# Patient Record
Sex: FEMALE | Race: BLACK OR AFRICAN AMERICAN | Employment: FULL TIME | URBAN - METROPOLITAN AREA
[De-identification: names, ages, dates, MRNs, and addresses within clinical notes are randomized per-mention and may not be internally consistent; named-entity substitution may affect disease eponyms.]

---

## 2019-02-06 ENCOUNTER — APPOINTMENT (OUTPATIENT)
Dept: CT IMAGING | Age: 54
End: 2019-02-06
Attending: EMERGENCY MEDICINE
Payer: COMMERCIAL

## 2019-02-06 ENCOUNTER — HOSPITAL ENCOUNTER (EMERGENCY)
Age: 54
Discharge: HOME | End: 2019-02-06
Attending: EMERGENCY MEDICINE
Payer: COMMERCIAL

## 2019-02-06 VITALS
OXYGEN SATURATION: 100 % | TEMPERATURE: 97.4 F | RESPIRATION RATE: 16 BRPM | DIASTOLIC BLOOD PRESSURE: 94 MMHG | HEART RATE: 67 BPM | SYSTOLIC BLOOD PRESSURE: 156 MMHG

## 2019-02-06 DIAGNOSIS — N39.0 ACUTE UTI: ICD-10-CM

## 2019-02-06 DIAGNOSIS — N20.1 LEFT URETERAL CALCULUS: Primary | ICD-10-CM

## 2019-02-06 LAB
ALBUMIN SERPL-MCNC: 3.3 G/DL (ref 3.4–5)
ALBUMIN/GLOB SERPL: 0.9 {RATIO} (ref 0.8–1.7)
ALP SERPL-CCNC: 87 U/L (ref 45–117)
ALT SERPL-CCNC: 22 U/L (ref 13–56)
ANION GAP SERPL CALC-SCNC: 7 MMOL/L (ref 3–18)
APPEARANCE UR: ABNORMAL
AST SERPL-CCNC: 11 U/L (ref 15–37)
BACTERIA URNS QL MICRO: ABNORMAL /HPF
BASOPHILS # BLD: 0 K/UL (ref 0–0.1)
BASOPHILS NFR BLD: 0 % (ref 0–3)
BILIRUB SERPL-MCNC: 0.4 MG/DL (ref 0.2–1)
BILIRUB UR QL: NEGATIVE
BUN SERPL-MCNC: 20 MG/DL (ref 7–18)
BUN/CREAT SERPL: 20 (ref 12–20)
CALCIUM SERPL-MCNC: 9.1 MG/DL (ref 8.5–10.1)
CHLORIDE SERPL-SCNC: 108 MMOL/L (ref 100–108)
CK MB CFR SERPL CALC: NORMAL % (ref 0–4)
CK MB SERPL-MCNC: <1 NG/ML (ref 5–25)
CK SERPL-CCNC: 66 U/L (ref 26–192)
CO2 SERPL-SCNC: 29 MMOL/L (ref 21–32)
COLOR UR: YELLOW
CREAT SERPL-MCNC: 1 MG/DL (ref 0.6–1.3)
DIFFERENTIAL METHOD BLD: ABNORMAL
EOSINOPHIL # BLD: 0 K/UL (ref 0–0.4)
EOSINOPHIL NFR BLD: 0 % (ref 0–5)
EPITH CASTS URNS QL MICRO: ABNORMAL /LPF (ref 0–5)
ERYTHROCYTE [DISTWIDTH] IN BLOOD BY AUTOMATED COUNT: 14.5 % (ref 11.6–14.5)
GLOBULIN SER CALC-MCNC: 3.5 G/DL (ref 2–4)
GLUCOSE SERPL-MCNC: 89 MG/DL (ref 74–99)
GLUCOSE UR STRIP.AUTO-MCNC: NEGATIVE MG/DL
HCG SERPL QL: NEGATIVE
HCT VFR BLD AUTO: 41.5 % (ref 35–45)
HGB BLD-MCNC: 12.9 G/DL (ref 12–16)
HGB UR QL STRIP: ABNORMAL
KETONES UR QL STRIP.AUTO: NEGATIVE MG/DL
LEUKOCYTE ESTERASE UR QL STRIP.AUTO: ABNORMAL
LIPASE SERPL-CCNC: 74 U/L (ref 73–393)
LYMPHOCYTES # BLD: 4.2 K/UL (ref 0.8–3.5)
LYMPHOCYTES NFR BLD: 42 % (ref 20–51)
MCH RBC QN AUTO: 28 PG (ref 24–34)
MCHC RBC AUTO-ENTMCNC: 31.1 G/DL (ref 31–37)
MCV RBC AUTO: 90 FL (ref 74–97)
MONOCYTES # BLD: 0.5 K/UL (ref 0–1)
MONOCYTES NFR BLD: 5 % (ref 2–9)
NEUTS SEG # BLD: 5.4 K/UL (ref 1.8–8)
NEUTS SEG NFR BLD: 53 % (ref 42–75)
NITRITE UR QL STRIP.AUTO: NEGATIVE
PH UR STRIP: 6 [PH] (ref 5–8)
PLATELET # BLD AUTO: 235 K/UL (ref 135–420)
PLATELET COMMENTS,PCOM: ABNORMAL
PMV BLD AUTO: 10 FL (ref 9.2–11.8)
POTASSIUM SERPL-SCNC: 3.2 MMOL/L (ref 3.5–5.5)
PROT SERPL-MCNC: 6.8 G/DL (ref 6.4–8.2)
PROT UR STRIP-MCNC: NEGATIVE MG/DL
RBC # BLD AUTO: 4.61 M/UL (ref 4.2–5.3)
RBC #/AREA URNS HPF: ABNORMAL /HPF (ref 0–5)
RBC MORPH BLD: ABNORMAL
SODIUM SERPL-SCNC: 144 MMOL/L (ref 136–145)
SP GR UR REFRACTOMETRY: >1.03 (ref 1–1.03)
TROPONIN I SERPL-MCNC: <0.02 NG/ML (ref 0–0.04)
UROBILINOGEN UR QL STRIP.AUTO: 0.2 EU/DL (ref 0.2–1)
WBC # BLD AUTO: 10.1 K/UL (ref 4.6–13.2)
WBC URNS QL MICRO: ABNORMAL /HPF (ref 0–4)

## 2019-02-06 PROCEDURE — 74011636320 HC RX REV CODE- 636/320: Performed by: EMERGENCY MEDICINE

## 2019-02-06 PROCEDURE — 99284 EMERGENCY DEPT VISIT MOD MDM: CPT

## 2019-02-06 PROCEDURE — 81001 URINALYSIS AUTO W/SCOPE: CPT

## 2019-02-06 PROCEDURE — 80053 COMPREHEN METABOLIC PANEL: CPT

## 2019-02-06 PROCEDURE — 96375 TX/PRO/DX INJ NEW DRUG ADDON: CPT

## 2019-02-06 PROCEDURE — 74011250636 HC RX REV CODE- 250/636: Performed by: EMERGENCY MEDICINE

## 2019-02-06 PROCEDURE — 87086 URINE CULTURE/COLONY COUNT: CPT

## 2019-02-06 PROCEDURE — 74011250637 HC RX REV CODE- 250/637: Performed by: EMERGENCY MEDICINE

## 2019-02-06 PROCEDURE — 96361 HYDRATE IV INFUSION ADD-ON: CPT

## 2019-02-06 PROCEDURE — 93005 ELECTROCARDIOGRAM TRACING: CPT

## 2019-02-06 PROCEDURE — 96376 TX/PRO/DX INJ SAME DRUG ADON: CPT

## 2019-02-06 PROCEDURE — 85025 COMPLETE CBC W/AUTO DIFF WBC: CPT

## 2019-02-06 PROCEDURE — 82550 ASSAY OF CK (CPK): CPT

## 2019-02-06 PROCEDURE — 84703 CHORIONIC GONADOTROPIN ASSAY: CPT

## 2019-02-06 PROCEDURE — 74011000258 HC RX REV CODE- 258: Performed by: EMERGENCY MEDICINE

## 2019-02-06 PROCEDURE — 74177 CT ABD & PELVIS W/CONTRAST: CPT

## 2019-02-06 PROCEDURE — 83690 ASSAY OF LIPASE: CPT

## 2019-02-06 PROCEDURE — 96365 THER/PROPH/DIAG IV INF INIT: CPT

## 2019-02-06 RX ORDER — ONDANSETRON 2 MG/ML
4 INJECTION INTRAMUSCULAR; INTRAVENOUS
Status: COMPLETED | OUTPATIENT
Start: 2019-02-06 | End: 2019-02-06

## 2019-02-06 RX ORDER — ONDANSETRON 8 MG/1
TABLET, ORALLY DISINTEGRATING ORAL
Qty: 10 TAB | Refills: 0 | Status: SHIPPED | OUTPATIENT
Start: 2019-02-06 | End: 2019-02-21

## 2019-02-06 RX ORDER — TAMSULOSIN HYDROCHLORIDE 0.4 MG/1
0.4 CAPSULE ORAL
Status: COMPLETED | OUTPATIENT
Start: 2019-02-06 | End: 2019-02-06

## 2019-02-06 RX ORDER — CIPROFLOXACIN 500 MG/1
500 TABLET ORAL 2 TIMES DAILY
Qty: 14 TAB | Refills: 0 | Status: SHIPPED | OUTPATIENT
Start: 2019-02-06 | End: 2019-02-13

## 2019-02-06 RX ORDER — MORPHINE SULFATE 1 MG/ML
4 INJECTION, SOLUTION EPIDURAL; INTRATHECAL; INTRAVENOUS
Status: COMPLETED | OUTPATIENT
Start: 2019-02-06 | End: 2019-02-06

## 2019-02-06 RX ORDER — KETOROLAC TROMETHAMINE 30 MG/ML
30 INJECTION, SOLUTION INTRAMUSCULAR; INTRAVENOUS
Status: COMPLETED | OUTPATIENT
Start: 2019-02-06 | End: 2019-02-06

## 2019-02-06 RX ORDER — IBUPROFEN 800 MG/1
800 TABLET ORAL EVERY 8 HOURS
Qty: 15 TAB | Refills: 0 | Status: SHIPPED | OUTPATIENT
Start: 2019-02-06 | End: 2019-02-11

## 2019-02-06 RX ORDER — TAMSULOSIN HYDROCHLORIDE 0.4 MG/1
CAPSULE ORAL
Qty: 10 CAP | Refills: 0 | Status: SHIPPED | OUTPATIENT
Start: 2019-02-06 | End: 2019-02-21

## 2019-02-06 RX ORDER — HYDROCODONE BITARTRATE AND ACETAMINOPHEN 5; 325 MG/1; MG/1
TABLET ORAL
Qty: 8 TAB | Refills: 0 | Status: SHIPPED | OUTPATIENT
Start: 2019-02-06

## 2019-02-06 RX ADMIN — CEFTRIAXONE SODIUM 1 G: 1 INJECTION, POWDER, FOR SOLUTION INTRAMUSCULAR; INTRAVENOUS at 17:22

## 2019-02-06 RX ADMIN — ONDANSETRON 4 MG: 2 INJECTION INTRAMUSCULAR; INTRAVENOUS at 17:23

## 2019-02-06 RX ADMIN — Medication 4 MG: at 15:28

## 2019-02-06 RX ADMIN — TAMSULOSIN HYDROCHLORIDE 0.4 MG: 0.4 CAPSULE ORAL at 17:24

## 2019-02-06 RX ADMIN — ONDANSETRON 4 MG: 2 INJECTION INTRAMUSCULAR; INTRAVENOUS at 15:28

## 2019-02-06 RX ADMIN — SODIUM CHLORIDE 1000 ML: 900 INJECTION, SOLUTION INTRAVENOUS at 15:27

## 2019-02-06 RX ADMIN — KETOROLAC TROMETHAMINE 30 MG: 30 INJECTION, SOLUTION INTRAMUSCULAR at 17:23

## 2019-02-06 RX ADMIN — IOPAMIDOL 89 ML: 612 INJECTION, SOLUTION INTRAVENOUS at 15:59

## 2019-02-06 NOTE — ED PROVIDER NOTES
EMERGENCY DEPARTMENT HISTORY AND PHYSICAL EXAM 
 
Date: 2/6/2019 Patient Name: Rosalio Costa History of Presenting Illness Chief Complaint Patient presents with  Abdominal Pain History Provided By: Patient Chief Complaint: abd pain Duration: several Days Timing:  Acute Location: generalized Quality: Cramping and Thyra [place] Severity: Moderate Modifying Factors: intermittent pain; h/o cholecystectomy Associated Symptoms: nausea, flank pain Additional History (Context): Rosalio Costa is a 52 y.o. female with obesity and prior cholecystectomy who presents with generalized abd pain, nausea; denies fever, dysuria, diarrhea. Had small bowel movement this morning. Prior cholecystectomy; unsure if she has an appendix. PCP: Nancy Durant MD 
 
Current Facility-Administered Medications Medication Dose Route Frequency Provider Last Rate Last Dose  cefTRIAXone (ROCEPHIN) 1 g in 0.9% sodium chloride (MBP/ADV) 50 mL MBP  1 g IntraVENous NOW Ayana Mosher PA      
 tamsulosin (FLOMAX) capsule 0.4 mg  0.4 mg Oral NOW Ayana Mosher PA      
 ketorolac (TORADOL) injection 30 mg  30 mg IntraVENous NOW Ayana Mosher PA      
 ondansetron (ZOFRAN) injection 4 mg  4 mg IntraVENous NOW Ayana Mosher PA Current Outpatient Medications Medication Sig Dispense Refill  ciprofloxacin HCl (CIPRO) 500 mg tablet Take 1 Tab by mouth two (2) times a day for 7 days. 14 Tab 0  
 tamsulosin (FLOMAX) 0.4 mg capsule Take 1 tab by mouth daily until kidney stone passes. 10 Cap 0  
 ondansetron (ZOFRAN ODT) 8 mg disintegrating tablet Take 1-2 tablets every 6-8 hours as needed for nausea and vomiting. 10 Tab 0  ibuprofen (MOTRIN) 800 mg tablet Take 1 Tab by mouth every eight (8) hours for 5 days. 15 Tab 0  
 HYDROcodone-acetaminophen (NORCO) 5-325 mg per tablet Take 1 tablet PO every 6 hours as needed for pain control.   If over the counter ibuprofen or acetaminophen was suggested, then only take the vicodin for pain not well controlled with the over the counter medication. 8 Tab 0  
 ZOLPIDEM TARTRATE (AMBIEN PO) Take  by mouth.  cyclobenzaprine (FLEXERIL) 10 mg tablet Take  by mouth three (3) times daily as needed.  traMADol (ULTRAM) 50 mg tablet Take 50 mg by mouth every six (6) hours as needed.  magnesium citrate solution Take 1/3 of bottle every 8 hours until finished or until you have a bowel movement. 1 Bottle 0 Past History Past Medical History: 
Past Medical History:  
Diagnosis Date  Asthma Past Surgical History: 
Past Surgical History:  
Procedure Laterality Date  HX  SECTION    
 HX OTHER SURGICAL Gall stones removed Family History: No family history on file. Social History: 
Social History Tobacco Use  Smoking status: Current Every Day Smoker Substance Use Topics  Alcohol use: No  
 Drug use: No  
 
 
Allergies: 
No Known Allergies Review of Systems Review of Systems Respiratory: Negative for cough and shortness of breath. Cardiovascular: Negative for chest pain. Gastrointestinal: Positive for abdominal pain and nausea. Negative for diarrhea and vomiting. Genitourinary: Positive for flank pain. Negative for dysuria. All other systems reviewed and are negative. All Other Systems Negative Physical Exam  
 
Vitals:  
 19 1444 19 1705 BP: (!) 156/100 (!) 156/94 Pulse: 64 67 Resp: 16 16 Temp: 97.4 °F (36.3 °C) SpO2: 100% 100% Physical Exam  
Constitutional: She is oriented to person, place, and time. She appears well-developed. HENT:  
Head: Normocephalic and atraumatic. Eyes: Pupils are equal, round, and reactive to light. Neck: No JVD present. No tracheal deviation present. No thyromegaly present. Cardiovascular: Normal rate, regular rhythm and normal heart sounds. Exam reveals no gallop and no friction rub. No murmur heard. Pulmonary/Chest: Effort normal and breath sounds normal. No stridor. No respiratory distress. She has no wheezes. She has no rales. She exhibits no tenderness. Abdominal: Soft. She exhibits distension. She exhibits no mass. There is tenderness. There is no rebound and no guarding. Moderate epigastric TTP; milder diffuse lower abd TTP. Distension secondary to body habitus. Musculoskeletal: She exhibits no edema or tenderness. Lymphadenopathy:  
  She has no cervical adenopathy. Neurological: She is alert and oriented to person, place, and time. Skin: Skin is warm and dry. No rash noted. No erythema. No pallor. Psychiatric: She has a normal mood and affect. Her behavior is normal. Thought content normal.  
Nursing note and vitals reviewed. Diagnostic Study Results Labs - Recent Results (from the past 12 hour(s)) CBC WITH AUTOMATED DIFF Collection Time: 02/06/19  3:03 PM  
Result Value Ref Range WBC 10.1 4.6 - 13.2 K/uL  
 RBC 4.61 4.20 - 5.30 M/uL  
 HGB 12.9 12.0 - 16.0 g/dL HCT 41.5 35.0 - 45.0 % MCV 90.0 74.0 - 97.0 FL  
 MCH 28.0 24.0 - 34.0 PG  
 MCHC 31.1 31.0 - 37.0 g/dL  
 RDW 14.5 11.6 - 14.5 % PLATELET 398 063 - 683 K/uL MPV 10.0 9.2 - 11.8 FL  
 NEUTROPHILS 53 42 - 75 % LYMPHOCYTES 42 20 - 51 % MONOCYTES 5 2 - 9 % EOSINOPHILS 0 0 - 5 % BASOPHILS 0 0 - 3 %  
 ABS. NEUTROPHILS 5.4 1.8 - 8.0 K/UL  
 ABS. LYMPHOCYTES 4.2 (H) 0.8 - 3.5 K/UL  
 ABS. MONOCYTES 0.5 0 - 1.0 K/UL  
 ABS. EOSINOPHILS 0.0 0.0 - 0.4 K/UL  
 ABS. BASOPHILS 0.0 0.0 - 0.1 K/UL PLATELET COMMENTS ADEQUATE PLATELETS    
 RBC COMMENTS NORMOCYTIC, NORMOCHROMIC    
 DF MANUAL METABOLIC PANEL, COMPREHENSIVE Collection Time: 02/06/19  3:03 PM  
Result Value Ref Range Sodium 144 136 - 145 mmol/L Potassium 3.2 (L) 3.5 - 5.5 mmol/L Chloride 108 100 - 108 mmol/L  
 CO2 29 21 - 32 mmol/L Anion gap 7 3.0 - 18 mmol/L Glucose 89 74 - 99 mg/dL BUN 20 (H) 7.0 - 18 MG/DL Creatinine 1.00 0.6 - 1.3 MG/DL  
 BUN/Creatinine ratio 20 12 - 20 GFR est AA >60 >60 ml/min/1.73m2 GFR est non-AA 58 (L) >60 ml/min/1.73m2 Calcium 9.1 8.5 - 10.1 MG/DL Bilirubin, total 0.4 0.2 - 1.0 MG/DL  
 ALT (SGPT) 22 13 - 56 U/L  
 AST (SGOT) 11 (L) 15 - 37 U/L Alk. phosphatase 87 45 - 117 U/L Protein, total 6.8 6.4 - 8.2 g/dL Albumin 3.3 (L) 3.4 - 5.0 g/dL Globulin 3.5 2.0 - 4.0 g/dL A-G Ratio 0.9 0.8 - 1.7 LIPASE Collection Time: 02/06/19  3:03 PM  
Result Value Ref Range Lipase 74 73 - 393 U/L  
CARDIAC PANEL,(CK, CKMB & TROPONIN) Collection Time: 02/06/19  3:03 PM  
Result Value Ref Range CK 66 26 - 192 U/L  
 CK - MB <1.0 <3.6 ng/ml CK-MB Index  0.0 - 4.0 % CALCULATION NOT PERFORMED WHEN RESULT IS BELOW LINEAR LIMIT Troponin-I, QT <0.02 0.0 - 0.045 NG/ML  
HCG QL SERUM Collection Time: 02/06/19  3:03 PM  
Result Value Ref Range HCG, Ql. NEGATIVE  NEG    
EKG, 12 LEAD, INITIAL Collection Time: 02/06/19  3:12 PM  
Result Value Ref Range Ventricular Rate 50 BPM  
 Atrial Rate 50 BPM  
 P-R Interval 142 ms QRS Duration 84 ms Q-T Interval 442 ms QTC Calculation (Bezet) 402 ms Calculated P Axis 41 degrees Calculated R Axis 20 degrees Calculated T Axis 41 degrees Diagnosis Sinus bradycardia Otherwise normal ECG When compared with ECG of 01-DEC-2012 03:42, 
Vent. rate has decreased BY  38 BPM 
T wave inversion no longer evident in Inferior leads URINALYSIS W/ RFLX MICROSCOPIC Collection Time: 02/06/19  3:20 PM  
Result Value Ref Range Color YELLOW Appearance CLOUDY Specific gravity >1.030 (H) 1.005 - 1.030  
 pH (UA) 6.0 5.0 - 8.0 Protein NEGATIVE  NEG mg/dL Glucose NEGATIVE  NEG mg/dL Ketone NEGATIVE  NEG mg/dL Bilirubin NEGATIVE  NEG Blood MODERATE (A) NEG Urobilinogen 0.2 0.2 - 1.0 EU/dL  Nitrites NEGATIVE  NEG    
 Leukocyte Esterase MODERATE (A) NEG URINE MICROSCOPIC ONLY Collection Time: 02/06/19  3:20 PM  
Result Value Ref Range WBC 8 to 10 0 - 4 /hpf  
 RBC 8 to 12 0 - 5 /hpf Epithelial cells 1+ 0 - 5 /lpf Bacteria FEW (A) NEG /hpf Radiologic Studies -  
CT ABD PELV W CONT Final Result IMPRESSION:  
  
1. Mild left obstructive uropathy related to a 4 mm calculus in the proximal  
left ureter just beyond the left ureteropelvic junction. 2. No additional acute findings otherwise within the abdomen or pelvis. Normal  
appendix. Sigmoid colon diverticulosis without evidence of acute inflammation. CT Results  (Last 48 hours) 02/06/19 1558  CT ABD PELV W CONT Final result Impression:  IMPRESSION:  
   
1. Mild left obstructive uropathy related to a 4 mm calculus in the proximal  
left ureter just beyond the left ureteropelvic junction. 2. No additional acute findings otherwise within the abdomen or pelvis. Normal  
appendix. Sigmoid colon diverticulosis without evidence of acute inflammation. Narrative:  EXAM: CT ABD PELV W CONT  
   
CLINICAL INDICATION/HISTORY: Worsening left-sided abdominal pain. COMPARISON: Abdominal radiographs 12/1/2012 TECHNIQUE:   CT of the abdomen and pelvis following intravenous contrast  
administration. Coronal and sagittal reformats were generated and reviewed. One or more dose reduction techniques were used on this CT: automated exposure  
control, adjustment of the mAs and/or kVp according to patient size, and  
iterative reconstruction techniques. The specific techniques used on this CT  
exam have been documented in the patient's electronic medical record. Digital  
Imaging and Communications in Medicine (DICOM) format image data are available  
to nonaffiliated external healthcare facilities or entities on a secure, media free, reciprocally searchable basis with patient authorization for at least a  
12-month period after this study. _______________ FINDINGS:  
   
LOWER THORAX: Mild left and right basilar dependent atelectasis. No acute  
pulmonary infiltrate. No pleural effusion. No global cardiomegaly or  
pericardial effusion. LIVER AND BILIARY: No suspicious liver lesions. No biliary dilation. Right  
upper quadrant surgical clips of prior cholecystectomy. SPLEEN:  Normal.  
   
PANCREAS:  Normal.  
   
ADRENALS:  Normal.  
   
KIDNEYS:  Normal-appearing right kidney. Asymmetrically decreased left  
corticomedullary enhancement with mild left perinephric inflammatory stranding  
and mild left hydroureteronephrosis related to a 4 mm calculus in the proximal  
left ureter, just beyond the left ureteropelvic junction (axial image 94). Beyond this calculus the left ureter returns to normal caliber. There are no  
additional left ureteral calculi. Additional nonobstructive 2 mm left renal  
calculus. LYMPH NODES:  No mesenteric or retroperitoneal lymphadenopathy. GI TRACT:  Sigmoid colon diverticulosis without evidence of acute inflammation. Normal caliber small and large bowel loops. No morphology of bowel obstruction. No bowel wall thickening. Normal appendix. PELVIC ORGANS:  Limited distended bladder appears within normal limits. No  
acute abnormality. VASCULATURE: Normal caliber lower thoracic and abdominal aorta . OTHER:   No ascites or free intraperitoneal air. OSSEOUS STRUCTURES:  No acute osseous abnormality. Moderate L3-S1 degenerative  
disk disease and facet arthropathy. _______________ CXR Results  (Last 48 hours) None Medical Decision Making I am the first provider for this patient.  
 
I reviewed the vital signs, available nursing notes, past medical history, past surgical history, family history and social history. Vital Signs-Reviewed the patient's vital signs. Records Reviewed: Nursing Notes Procedures: 
Procedures Provider Notes (Medical Decision Making): uncomfortable appearing; get labs, scan. 4mm LUPJ stone; 8-10 WBC and moderate leuks on UA. Urine cultured. Give rocephin now and d/c home with ABX, meds for pain, nausea. Spoke with Dr. Nyla Rosenberg from urology to review CT findings w/UA findings. Pt's VS stable, no leukocytosis on CBC. Agrees w/plan for discharge and outpatient f/up. Pt given strict return precautions for fever, worsening pain, vomiting through anti-emetic. 
 
5:14 PM 
I have spent 35 minutes of critical care time involved in lab review, consultations with specialist, family decision-making, and documentation. During this entire length of time I was immediately available to the patient. Critical Care: The reason for providing this level of medical care for this critically ill patient was due a critical illness that impaired one or more vital organ systems such that there was a high probability of imminent or life threatening deterioration in the patients condition. This care involved high complexity decision making to assess, manipulate, and support vital system functions, to treat this degreee vital organ system failure and to prevent further life threatening deterioration of the patients condition. MED RECONCILIATION: 
Current Facility-Administered Medications Medication Dose Route Frequency  cefTRIAXone (ROCEPHIN) 1 g in 0.9% sodium chloride (MBP/ADV) 50 mL MBP  1 g IntraVENous NOW  tamsulosin (FLOMAX) capsule 0.4 mg  0.4 mg Oral NOW  ketorolac (TORADOL) injection 30 mg  30 mg IntraVENous NOW  ondansetron (ZOFRAN) injection 4 mg  4 mg IntraVENous NOW Current Outpatient Medications Medication Sig  ciprofloxacin HCl (CIPRO) 500 mg tablet Take 1 Tab by mouth two (2) times a day for 7 days.  tamsulosin (FLOMAX) 0.4 mg capsule Take 1 tab by mouth daily until kidney stone passes.  ondansetron (ZOFRAN ODT) 8 mg disintegrating tablet Take 1-2 tablets every 6-8 hours as needed for nausea and vomiting.  ibuprofen (MOTRIN) 800 mg tablet Take 1 Tab by mouth every eight (8) hours for 5 days.  HYDROcodone-acetaminophen (NORCO) 5-325 mg per tablet Take 1 tablet PO every 6 hours as needed for pain control. If over the counter ibuprofen or acetaminophen was suggested, then only take the vicodin for pain not well controlled with the over the counter medication.  ZOLPIDEM TARTRATE (AMBIEN PO) Take  by mouth.  cyclobenzaprine (FLEXERIL) 10 mg tablet Take  by mouth three (3) times daily as needed.  traMADol (ULTRAM) 50 mg tablet Take 50 mg by mouth every six (6) hours as needed.  magnesium citrate solution Take 1/3 of bottle every 8 hours until finished or until you have a bowel movement. Disposition: 
home DISCHARGE NOTE:  
5:14 PM 
 
Pt has been reexamined. Patient has no new complaints, changes, or physical findings. Care plan outlined and precautions discussed. Results of labs, CT were reviewed with the patient. All medications were reviewed with the patient; will d/c home with see below. All of pt's questions and concerns were addressed. Patient was instructed and agrees to follow up with urology, as well as to return to the ED upon further deterioration. Patient is ready to go home. Follow-up Information Follow up With Specialties Details Why Contact Info Lexie Saul MD Urology Schedule an appointment as soon as possible for a visit in 1 day  [address] [place] [address] 
[phone] [facility] EMERGENCY DEPT Emergency Medicine  If symptoms worsen return immediately [address] 
[phone] Current Discharge Medication List  
  
START taking these medications Details ciprofloxacin HCl (CIPRO) 500 mg tablet Take 1 Tab by mouth two (2) times a day for 7 days. Qty: 14 Tab, Refills: 0  
  
tamsulosin (FLOMAX) 0.4 mg capsule Take 1 tab by mouth daily until kidney stone passes. Qty: 10 Cap, Refills: 0  
  
ondansetron (ZOFRAN ODT) 8 mg disintegrating tablet Take 1-2 tablets every 6-8 hours as needed for nausea and vomiting. Qty: 10 Tab, Refills: 0  
  
ibuprofen (MOTRIN) 800 mg tablet Take 1 Tab by mouth every eight (8) hours for 5 days. Qty: 15 Tab, Refills: 0 CONTINUE these medications which have CHANGED Details HYDROcodone-acetaminophen (NORCO) 5-325 mg per tablet Take 1 tablet PO every 6 hours as needed for pain control. If over the counter ibuprofen or acetaminophen was suggested, then only take the vicodin for pain not well controlled with the over the counter medication. Qty: 8 Tab, Refills: 0 Associated Diagnoses: Left ureteral calculus STOP taking these medications  
  
 azithromycin (ZITHROMAX Z-BRANDI) 250 mg tablet Comments:  
Reason for Stopping:   
   
  
 
 
 
 
 
Diagnosis Clinical Impression: 1. Left ureteral calculus 2. Acute UTI

## 2019-02-06 NOTE — DISCHARGE INSTRUCTIONS
Patient Education        Kidney Stone: Care Instructions  Your Care Instructions    Kidney stones are formed when salts, minerals, and other substances normally found in the urine clump together. They can be as small as grains of sand or, rarely, as large as golf balls. While the stone is traveling through the ureter, which is the tube that carries urine from the kidney to the bladder, you will probably feel pain. The pain may be mild or very severe. You may also have some blood in your urine. As soon as the stone reaches the bladder, any intense pain should go away. If a stone is too large to pass on its own, you may need a medical procedure to help you pass the stone. The doctor has checked you carefully, but problems can develop later. If you notice any problems or new symptoms, get medical treatment right away. Follow-up care is a key part of your treatment and safety. Be sure to make and go to all appointments, and call your doctor if you are having problems. It's also a good idea to know your test results and keep a list of the medicines you take. How can you care for yourself at home? · Drink plenty of fluids, enough so that your urine is light yellow or clear like water. If you have kidney, heart, or liver disease and have to limit fluids, talk with your doctor before you increase the amount of fluids you drink. · Take pain medicines exactly as directed. Call your doctor if you think you are having a problem with your medicine. ? If the doctor gave you a prescription medicine for pain, take it as prescribed. ? If you are not taking a prescription pain medicine, ask your doctor if you can take an over-the-counter medicine. Read and follow all instructions on the label. · Your doctor may ask you to strain your urine so that you can collect your kidney stone when it passes. You can use a kitchen strainer or a tea strainer to catch the stone.  Store it in a plastic bag until you see your doctor again.  Preventing future kidney stones  Some changes in your diet may help prevent kidney stones. Depending on the cause of your stones, your doctor may recommend that you:  · Drink plenty of fluids, enough so that your urine is light yellow or clear like water. If you have kidney, heart, or liver disease and have to limit fluids, talk with your doctor before you increase the amount of fluids you drink. · Limit coffee, tea, and alcohol. Also avoid grapefruit juice. · Do not take more than the recommended daily dose of vitamins C and D.  · Avoid antacids such as Gaviscon, Maalox, Mylanta, or Tums. · Limit the amount of salt (sodium) in your diet. · Eat a balanced diet that is not too high in protein. · Limit foods that are high in a substance called oxalate, which can cause kidney stones. These foods include dark green vegetables, rhubarb, chocolate, wheat bran, nuts, cranberries, and beans. When should you call for help? Call your doctor now or seek immediate medical care if:    · You cannot keep down fluids.     · Your pain gets worse.     · You have a fever or chills.     · You have new or worse pain in your back just below your rib cage (the flank area).     · You have new or more blood in your urine.    Watch closely for changes in your health, and be sure to contact your doctor if:    · You do not get better as expected. Where can you learn more? Go to http://corina-mason.info/. Enter V838 in the search box to learn more about \"Kidney Stone: Care Instructions. \"  Current as of: March 14, 2018  Content Version: 11.9  © 4669-8193 [employer]. Care instructions adapted under license by [employer] (which disclaims liability or warranty for this information).  If you have questions about a medical condition or this instruction, always ask your healthcare professional. [address] any warranty or liability for your use of this information. Patient Education        Urinary Tract Infection in Women: Care Instructions  Your Care Instructions    A urinary tract infection, or UTI, is a general term for an infection anywhere between the kidneys and the urethra (where urine comes out). Most UTIs are bladder infections. They often cause pain or burning when you urinate. UTIs are caused by bacteria and can be cured with antibiotics. Be sure to complete your treatment so that the infection goes away. Follow-up care is a key part of your treatment and safety. Be sure to make and go to all appointments, and call your doctor if you are having problems. It's also a good idea to know your test results and keep a list of the medicines you take. How can you care for yourself at home? · Take your antibiotics as directed. Do not stop taking them just because you feel better. You need to take the full course of antibiotics. · Drink extra water and other fluids for the next day or two. This may help wash out the bacteria that are causing the infection. (If you have kidney, heart, or liver disease and have to limit fluids, talk with your doctor before you increase your fluid intake.)  · Avoid drinks that are carbonated or have caffeine. They can irritate the bladder. · Urinate often. Try to empty your bladder each time. · To relieve pain, take a hot bath or lay a heating pad set on low over your lower belly or genital area. Never go to sleep with a heating pad in place. To prevent UTIs  · Drink plenty of water each day. This helps you urinate often, which clears bacteria from your system. (If you have kidney, heart, or liver disease and have to limit fluids, talk with your doctor before you increase your fluid intake.)  · Urinate when you need to. · Urinate right after you have sex. · Change sanitary pads often. · Avoid douches, bubble baths, feminine hygiene sprays, and other feminine hygiene products that have deodorants.   · After going to the bathroom, wipe from front to back. When should you call for help? Call your doctor now or seek immediate medical care if:    · Symptoms such as fever, chills, nausea, or vomiting get worse or appear for the first time.     · You have new pain in your back just below your rib cage. This is called flank pain.     · There is new blood or pus in your urine.     · You have any problems with your antibiotic medicine.    Watch closely for changes in your health, and be sure to contact your doctor if:    · You are not getting better after taking an antibiotic for 2 days.     · Your symptoms go away but then come back. Where can you learn more? Go to http://corina-mason.info/. Enter J714 in the search box to learn more about \"Urinary Tract Infection in Women: Care Instructions. \"  Current as of: March 20, 2018  Content Version: 11.9  © 4611-2505 [employer]. Care instructions adapted under license by [employer] (which disclaims liability or warranty for this information). If you have questions about a medical condition or this instruction, always ask your healthcare professional. [address] any warranty or liability for your use of this information.

## 2019-02-07 LAB
ATRIAL RATE: 50 BPM
CALCULATED P AXIS, ECG09: 41 DEGREES
CALCULATED R AXIS, ECG10: 20 DEGREES
CALCULATED T AXIS, ECG11: 41 DEGREES
DIAGNOSIS, 93000: NORMAL
P-R INTERVAL, ECG05: 142 MS
Q-T INTERVAL, ECG07: 442 MS
QRS DURATION, ECG06: 84 MS
QTC CALCULATION (BEZET), ECG08: 402 MS
VENTRICULAR RATE, ECG03: 50 BPM

## 2019-02-08 LAB
BACTERIA SPEC CULT: NORMAL
SERVICE CMNT-IMP: NORMAL

## 2019-02-10 ENCOUNTER — APPOINTMENT (OUTPATIENT)
Dept: GENERAL RADIOLOGY | Age: 54
End: 2019-02-10
Attending: EMERGENCY MEDICINE
Payer: COMMERCIAL

## 2019-02-10 ENCOUNTER — HOSPITAL ENCOUNTER (EMERGENCY)
Age: 54
Discharge: HOME | End: 2019-02-10
Attending: EMERGENCY MEDICINE
Payer: COMMERCIAL

## 2019-02-10 VITALS
WEIGHT: 284 LBS | HEIGHT: 68 IN | TEMPERATURE: 97.5 F | HEART RATE: 64 BPM | RESPIRATION RATE: 16 BRPM | SYSTOLIC BLOOD PRESSURE: 116 MMHG | OXYGEN SATURATION: 100 % | DIASTOLIC BLOOD PRESSURE: 72 MMHG | BODY MASS INDEX: 43.04 KG/M2

## 2019-02-10 DIAGNOSIS — N20.0 NEPHROLITHIASIS: Primary | ICD-10-CM

## 2019-02-10 DIAGNOSIS — R11.0 NAUSEA WITHOUT VOMITING: ICD-10-CM

## 2019-02-10 LAB
ANION GAP SERPL CALC-SCNC: 7 MMOL/L (ref 3–18)
APPEARANCE UR: CLEAR
BACTERIA URNS QL MICRO: ABNORMAL /HPF
BASOPHILS # BLD: 0 K/UL (ref 0–0.1)
BASOPHILS NFR BLD: 0 % (ref 0–2)
BILIRUB UR QL: NEGATIVE
BUN SERPL-MCNC: 12 MG/DL (ref 7–18)
BUN/CREAT SERPL: 13 (ref 12–20)
CALCIUM SERPL-MCNC: 9.4 MG/DL (ref 8.5–10.1)
CHLORIDE SERPL-SCNC: 108 MMOL/L (ref 100–108)
CO2 SERPL-SCNC: 27 MMOL/L (ref 21–32)
COLOR UR: YELLOW
CREAT SERPL-MCNC: 0.95 MG/DL (ref 0.6–1.3)
DIFFERENTIAL METHOD BLD: NORMAL
EOSINOPHIL # BLD: 0.2 K/UL (ref 0–0.4)
EOSINOPHIL NFR BLD: 2 % (ref 0–5)
EPITH CASTS URNS QL MICRO: ABNORMAL /LPF (ref 0–5)
ERYTHROCYTE [DISTWIDTH] IN BLOOD BY AUTOMATED COUNT: 13.9 % (ref 11.6–14.5)
GLUCOSE SERPL-MCNC: 108 MG/DL (ref 74–99)
GLUCOSE UR STRIP.AUTO-MCNC: NEGATIVE MG/DL
HCT VFR BLD AUTO: 42.8 % (ref 35–45)
HGB BLD-MCNC: 13.6 G/DL (ref 12–16)
HGB UR QL STRIP: ABNORMAL
KETONES UR QL STRIP.AUTO: NEGATIVE MG/DL
LEUKOCYTE ESTERASE UR QL STRIP.AUTO: ABNORMAL
LYMPHOCYTES # BLD: 2.4 K/UL (ref 0.9–3.6)
LYMPHOCYTES NFR BLD: 36 % (ref 21–52)
MCH RBC QN AUTO: 28.5 PG (ref 24–34)
MCHC RBC AUTO-ENTMCNC: 31.8 G/DL (ref 31–37)
MCV RBC AUTO: 89.5 FL (ref 74–97)
MONOCYTES # BLD: 0.2 K/UL (ref 0.05–1.2)
MONOCYTES NFR BLD: 4 % (ref 3–10)
MUCOUS THREADS URNS QL MICRO: ABNORMAL /LPF
NEUTS SEG # BLD: 3.9 K/UL (ref 1.8–8)
NEUTS SEG NFR BLD: 58 % (ref 40–73)
NITRITE UR QL STRIP.AUTO: NEGATIVE
PH UR STRIP: 7 [PH] (ref 5–8)
PLATELET # BLD AUTO: 220 K/UL (ref 135–420)
PMV BLD AUTO: 10.2 FL (ref 9.2–11.8)
POTASSIUM SERPL-SCNC: 3.7 MMOL/L (ref 3.5–5.5)
PROT UR STRIP-MCNC: NEGATIVE MG/DL
RBC # BLD AUTO: 4.78 M/UL (ref 4.2–5.3)
RBC #/AREA URNS HPF: ABNORMAL /HPF (ref 0–5)
SODIUM SERPL-SCNC: 142 MMOL/L (ref 136–145)
SP GR UR REFRACTOMETRY: 1.01 (ref 1–1.03)
UROBILINOGEN UR QL STRIP.AUTO: 0.2 EU/DL (ref 0.2–1)
WBC # BLD AUTO: 6.7 K/UL (ref 4.6–13.2)
WBC URNS QL MICRO: ABNORMAL /HPF (ref 0–4)

## 2019-02-10 PROCEDURE — 74011250637 HC RX REV CODE- 250/637: Performed by: EMERGENCY MEDICINE

## 2019-02-10 PROCEDURE — 96361 HYDRATE IV INFUSION ADD-ON: CPT

## 2019-02-10 PROCEDURE — 99284 EMERGENCY DEPT VISIT MOD MDM: CPT

## 2019-02-10 PROCEDURE — 81001 URINALYSIS AUTO W/SCOPE: CPT

## 2019-02-10 PROCEDURE — 87086 URINE CULTURE/COLONY COUNT: CPT

## 2019-02-10 PROCEDURE — 74022 RADEX COMPL AQT ABD SERIES: CPT

## 2019-02-10 PROCEDURE — 85025 COMPLETE CBC W/AUTO DIFF WBC: CPT

## 2019-02-10 PROCEDURE — 96374 THER/PROPH/DIAG INJ IV PUSH: CPT

## 2019-02-10 PROCEDURE — 74011250636 HC RX REV CODE- 250/636: Performed by: EMERGENCY MEDICINE

## 2019-02-10 PROCEDURE — 80048 BASIC METABOLIC PNL TOTAL CA: CPT

## 2019-02-10 RX ORDER — HYDROCODONE BITARTRATE AND ACETAMINOPHEN 5; 325 MG/1; MG/1
2 TABLET ORAL
Status: COMPLETED | OUTPATIENT
Start: 2019-02-10 | End: 2019-02-10

## 2019-02-10 RX ORDER — HYDROCODONE BITARTRATE AND ACETAMINOPHEN 5; 325 MG/1; MG/1
1 TABLET ORAL
Qty: 20 TAB | Refills: 0 | Status: SHIPPED | OUTPATIENT
Start: 2019-02-10 | End: 2019-02-21

## 2019-02-10 RX ORDER — ONDANSETRON 4 MG/1
TABLET, ORALLY DISINTEGRATING ORAL
Qty: 10 TAB | Refills: 0 | Status: SHIPPED | OUTPATIENT
Start: 2019-02-10 | End: 2019-02-21

## 2019-02-10 RX ORDER — KETOROLAC TROMETHAMINE 30 MG/ML
30 INJECTION, SOLUTION INTRAMUSCULAR; INTRAVENOUS
Status: COMPLETED | OUTPATIENT
Start: 2019-02-10 | End: 2019-02-10

## 2019-02-10 RX ORDER — SODIUM CHLORIDE 9 MG/ML
150 INJECTION, SOLUTION INTRAVENOUS ONCE
Status: COMPLETED | OUTPATIENT
Start: 2019-02-10 | End: 2019-02-10

## 2019-02-10 RX ORDER — DOCUSATE SODIUM 100 MG/1
100 CAPSULE, LIQUID FILLED ORAL 2 TIMES DAILY
Qty: 60 CAP | Refills: 2 | Status: SHIPPED | OUTPATIENT
Start: 2019-02-10 | End: 2019-02-21

## 2019-02-10 RX ORDER — ONDANSETRON 8 MG/1
8 TABLET, ORALLY DISINTEGRATING ORAL
Status: DISCONTINUED | OUTPATIENT
Start: 2019-02-10 | End: 2019-02-10 | Stop reason: HOSPADM

## 2019-02-10 RX ADMIN — HYDROCODONE BITARTRATE AND ACETAMINOPHEN 2 TABLET: 5; 325 TABLET ORAL at 10:17

## 2019-02-10 RX ADMIN — SODIUM CHLORIDE 150 ML/HR: 900 INJECTION, SOLUTION INTRAVENOUS at 09:38

## 2019-02-10 RX ADMIN — KETOROLAC TROMETHAMINE 30 MG: 30 INJECTION, SOLUTION INTRAMUSCULAR at 09:38

## 2019-02-10 NOTE — LETTER
[facility] EMERGENCY DEPT 
[address] [facility] [id number] [phone] Work/School Note Date: 2/10/2019 To Whom It May concern: 
 
Trinidad Paul was seen and treated today in the emergency room by the following provider(s): 
Attending Provider: Danilo Smart MD. Trinidad Paul may return to work on 2/12/2019.  
 
Sincerely, 
 
 
 
 
Jeb Degroot RN

## 2019-02-10 NOTE — ED TRIAGE NOTES
Patient was seen on Wednesday, dx with kidney stones, was told to go home and return if pain was worse, pain is worse and patient states she has not passed the stone/s

## 2019-02-10 NOTE — DISCHARGE INSTRUCTIONS
Please return for increased pain, fevers, chills, chest pain, shortness of breath or any other concerns    Please see your doctor or urologist in 24-48 hours for a recheck.

## 2019-02-10 NOTE — ED PROVIDER NOTES
EMERGENCY DEPARTMENT HISTORY AND PHYSICAL EXAM 
 
9:07 AM 
 
 
Date: 2/10/2019 Patient Name: Sarita German History of Presenting Illness Chief Complaint Patient presents with  Flank Pain  Nausea History Provided By: Patient Additional History (Context): Sarita German is a 52 y.o. female with a history of kidney stones who presents with moderate worsening left flank pain radiating to her abdomen with associated nausea, subjective fever, and chills that began 2 weeks ago. Also reports left sided neck pain that began this morning. Denies vomiting, difficulty urinating, chest pain, and SOB. She patient states she was seen on 2/6/19 at [facility] ED and diagnosed with a left kidney stone. She was prescribed pain medication. Her last dose was this morning. The patient was discharged and her pain has worsened. She has not yet passed the stone. PCP: Citlali Cotto MD 
 
Chief Complaint: Flank Pain Duration:  2 weeks Timing:  Worsening Location: Left flank radiating to her abdomen Quality: Cramping Severity: Moderate Modifying Factors: The patient was seen on 2/6/19 at [facility] ED and diagnosed with a left kidney stone Associated Symptoms: Abdominal pain, nausea, subjective fever, and chills. Denies vomiting, difficulty urinating, chest pain, and SOB Current Facility-Administered Medications Medication Dose Route Frequency Provider Last Rate Last Dose  ondansetron (ZOFRAN ODT) tablet 8 mg  8 mg Oral Q8H PRN Cecilia Spann MD      
 
Current Outpatient Medications Medication Sig Dispense Refill  ciprofloxacin HCl (CIPRO) 500 mg tablet Take 1 Tab by mouth two (2) times a day for 7 days. 14 Tab 0  
 tamsulosin (FLOMAX) 0.4 mg capsule Take 1 tab by mouth daily until kidney stone passes. 10 Cap 0  
 ondansetron (ZOFRAN ODT) 8 mg disintegrating tablet Take 1-2 tablets every 6-8 hours as needed for nausea and vomiting.  10 Tab 0  
  ibuprofen (MOTRIN) 800 mg tablet Take 1 Tab by mouth every eight (8) hours for 5 days. 15 Tab 0  
 HYDROcodone-acetaminophen (NORCO) 5-325 mg per tablet Take 1 tablet PO every 6 hours as needed for pain control. If over the counter ibuprofen or acetaminophen was suggested, then only take the vicodin for pain not well controlled with the over the counter medication. 8 Tab 0  
 ZOLPIDEM TARTRATE (AMBIEN PO) Take  by mouth.  cyclobenzaprine (FLEXERIL) 10 mg tablet Take  by mouth three (3) times daily as needed.  traMADol (ULTRAM) 50 mg tablet Take 50 mg by mouth every six (6) hours as needed.  magnesium citrate solution Take 1/3 of bottle every 8 hours until finished or until you have a bowel movement. 1 Bottle 0 Past History Past Medical History: 
Past Medical History:  
Diagnosis Date  Asthma Past Surgical History: 
Past Surgical History:  
Procedure Laterality Date  HX  SECTION    
 HX OTHER SURGICAL Gall stones removed Family History: No family history on file. Social History: 
Social History Tobacco Use  Smoking status: Current Every Day Smoker Substance Use Topics  Alcohol use: No  
 Drug use: No  
 
 
Allergies: 
No Known Allergies Review of Systems Review of Systems Constitutional: Positive for chills and fever. Negative for fatigue. HENT: Negative for congestion. Eyes: Negative for visual disturbance. Respiratory: Negative for shortness of breath. Cardiovascular: Negative for chest pain and leg swelling. Gastrointestinal: Positive for nausea. Negative for abdominal pain and vomiting. Endocrine: Negative for polyuria. Genitourinary: Positive for flank pain. Negative for difficulty urinating and dysuria. Musculoskeletal: Positive for neck pain. Skin: Negative for rash. Neurological: Negative for weakness. Psychiatric/Behavioral: Negative for behavioral problems. All other systems reviewed and are negative. Physical Exam  
 
Visit Vitals BP (!) 162/99 (BP 1 Location: Right arm, BP Patient Position: At rest;Sitting) Pulse 64 Temp 97.5 °F (36.4 °C) Resp 16 Ht 5' 8\" (1.727 m) Wt 128.8 kg (284 lb) SpO2 100% BMI 43.18 kg/m² Physical Exam  
Constitutional: She is oriented to person, place, and time. She appears well-developed and well-nourished. Appears uncomfortable. HENT:  
Head: Normocephalic and atraumatic. Nose: Nose normal.  
Eyes: Conjunctivae are normal.  
Neck: Neck supple. No tracheal deviation present. Cardiovascular: Normal rate, regular rhythm and normal heart sounds. Pulmonary/Chest: Effort normal and breath sounds normal. No respiratory distress. Abdominal: Soft. She exhibits no distension. There is no tenderness. Musculoskeletal: Normal range of motion. She exhibits no edema. Lymphadenopathy:  
  She has no cervical adenopathy. Neurological: She is alert and oriented to person, place, and time. No cranial nerve deficit. Grossly intact Skin: Skin is warm and dry. Psychiatric: She has a normal mood and affect. Vitals reviewed. Diagnostic Study Results Labs - Recent Results (from the past 12 hour(s)) URINALYSIS W/ RFLX MICROSCOPIC Collection Time: 02/10/19  8:51 AM  
Result Value Ref Range Color YELLOW Appearance CLEAR Specific gravity 1.011 1.005 - 1.030    
 pH (UA) 7.0 5.0 - 8.0 Protein NEGATIVE  NEG mg/dL Glucose NEGATIVE  NEG mg/dL Ketone NEGATIVE  NEG mg/dL Bilirubin NEGATIVE  NEG Blood TRACE (A) NEG Urobilinogen 0.2 0.2 - 1.0 EU/dL Nitrites NEGATIVE  NEG Leukocyte Esterase MODERATE (A) NEG URINE MICROSCOPIC ONLY Collection Time: 02/10/19  8:51 AM  
Result Value Ref Range WBC 0 to 3 0 - 4 /hpf  
 RBC 0 to 3 0 - 5 /hpf Epithelial cells 4+ 0 - 5 /lpf Bacteria 2+ (A) NEG /hpf  Mucus 2+ (A) NEG /lpf  
CBC WITH AUTOMATED DIFF  
 Collection Time: 02/10/19  9:04 AM  
Result Value Ref Range WBC 6.7 4.6 - 13.2 K/uL  
 RBC 4.78 4.20 - 5.30 M/uL  
 HGB 13.6 12.0 - 16.0 g/dL HCT 42.8 35.0 - 45.0 % MCV 89.5 74.0 - 97.0 FL  
 MCH 28.5 24.0 - 34.0 PG  
 MCHC 31.8 31.0 - 37.0 g/dL  
 RDW 13.9 11.6 - 14.5 % PLATELET 345 059 - 954 K/uL MPV 10.2 9.2 - 11.8 FL  
 NEUTROPHILS 58 40 - 73 % LYMPHOCYTES 36 21 - 52 % MONOCYTES 4 3 - 10 % EOSINOPHILS 2 0 - 5 % BASOPHILS 0 0 - 2 %  
 ABS. NEUTROPHILS 3.9 1.8 - 8.0 K/UL  
 ABS. LYMPHOCYTES 2.4 0.9 - 3.6 K/UL  
 ABS. MONOCYTES 0.2 0.05 - 1.2 K/UL  
 ABS. EOSINOPHILS 0.2 0.0 - 0.4 K/UL  
 ABS. BASOPHILS 0.0 0.0 - 0.1 K/UL  
 DF AUTOMATED METABOLIC PANEL, BASIC Collection Time: 02/10/19  9:04 AM  
Result Value Ref Range Sodium 142 136 - 145 mmol/L Potassium 3.7 3.5 - 5.5 mmol/L Chloride 108 100 - 108 mmol/L  
 CO2 27 21 - 32 mmol/L Anion gap 7 3.0 - 18 mmol/L Glucose 108 (H) 74 - 99 mg/dL BUN 12 7.0 - 18 MG/DL Creatinine 0.95 0.6 - 1.3 MG/DL  
 BUN/Creatinine ratio 13 12 - 20 GFR est AA >60 >60 ml/min/1.73m2 GFR est non-AA >60 >60 ml/min/1.73m2 Calcium 9.4 8.5 - 10.1 MG/DL Radiologic Studies -  
XR ABD ACUTE W 1 V CHEST    (Results Pending) Medical Decision Making It should be noted that I, Jesika Tian MD will be the provider of record for this patient. I reviewed the vital signs, available nursing notes, past medical history, past surgical history, family history and social history. Vital Signs-Reviewed the patient's vital signs. Pulse Oximetry Analysis -  100% on room air (Interpretation) WNL Records Reviewed: Nursing Notes, Old Medical Records and Previous Radiology Studies (Time of Review: 9:07 AM) ED Course: Progress Notes, Reevaluation, and Consults: 
 
9:16 AM Urine culture likely contaminated. 10:15 AM Jenni Arias (Urology) will provide with follow up in the office. 10:21 AM Patient's pain is well controlled. Agrees to follow up and will go home. ED Course as of Feb 10 1023 Urszula Too Feb 10, 2019  
0906 IMPRESSION IMPRESSION: 
  
1. Mild left obstructive uropathy related to a 4 mm calculus in the proximal 
left ureter just beyond the left ureteropelvic junction. 2. No additional acute findings otherwise within the abdomen or pelvis. Normal 
appendix. Sigmoid colon diverticulosis without evidence of acute inflammation. 
   [BT] 5633 Pt with urine contaminated  [BT] ED Course User Index [BT] Meri Cabrera MD  
 
Provider Notes (Medical Decision Making):  
 
Diagnosis Clinical Impression: 1. Nephrolithiasis 2. Nausea without vomiting Disposition: Home Follow-up Information Follow up With Specialties Details Why Contact Info David Arreola MD Urology Schedule an appointment as soon as possible for a visit in 1 day  [address] [place] [address] 
[phone] Medication List  
  
CONTINUE taking these medications HYDROcodone-acetaminophen 5-325 mg per tablet Commonly known as:  14[address] Long[address] Take 1 tablet PO every 6 hours as needed for pain control. If over the counter ibuprofen or acetaminophen was suggested, then only take the vicodin for pain not well controlled with the over the counter medication. ASK your doctor about these medications AMBIEN PO 
  
ciprofloxacin HCl 500 mg tablet Commonly known as:  CIPRO Take 1 Tab by mouth two (2) times a day for 7 days. FLEXERIL 10 mg tablet Generic drug:  cyclobenzaprine 
  
ibuprofen 800 mg tablet Commonly known as:  MOTRIN Take 1 Tab by mouth every eight (8) hours for 5 days. magnesium citrate solution Take 1/3 of bottle every 8 hours until finished or until you have a bowel movement. ondansetron 8 mg disintegrating tablet Commonly known as:  ZOFRAN ODT Take 1-2 tablets every 6-8 hours as needed for nausea and vomiting. tamsulosin 0.4 mg capsule Commonly known as:  FLOMAX Take 1 tab by mouth daily until kidney stone passes. traMADol 50 mg tablet Commonly known as:  ULTRAM 
  
  
 
_______________________________ Scribe Attestation Anu Obrien acting as a scribe for and in the presence of Zenaida Medina MD     
February 10, 2019 at 10:23 AM 
    
Provider Attestation:     
I personally performed the services described in the documentation, reviewed the documentation, as recorded by the scribe in my presence, and it accurately and completely records my words and actions. February 10, 2019 at 10:23 AM - Zenaida Medina MD   
 
 
_______________________________

## 2019-02-11 LAB
BACTERIA SPEC CULT: NORMAL
SERVICE CMNT-IMP: NORMAL

## 2021-04-14 ENCOUNTER — OFFICE VISIT (OUTPATIENT)
Dept: ORTHOPEDIC SURGERY | Age: 56
End: 2021-04-14
Payer: COMMERCIAL

## 2021-04-14 VITALS
HEIGHT: 68 IN | OXYGEN SATURATION: 99 % | TEMPERATURE: 97.3 F | HEART RATE: 79 BPM | BODY MASS INDEX: 41.71 KG/M2 | WEIGHT: 275.2 LBS

## 2021-04-14 DIAGNOSIS — G89.29 CHRONIC PAIN OF BOTH KNEES: ICD-10-CM

## 2021-04-14 DIAGNOSIS — M17.11 PRIMARY OSTEOARTHRITIS OF RIGHT KNEE: Primary | ICD-10-CM

## 2021-04-14 DIAGNOSIS — M25.561 CHRONIC PAIN OF BOTH KNEES: ICD-10-CM

## 2021-04-14 DIAGNOSIS — M17.12 PRIMARY OSTEOARTHRITIS OF LEFT KNEE: ICD-10-CM

## 2021-04-14 DIAGNOSIS — M25.562 CHRONIC PAIN OF BOTH KNEES: ICD-10-CM

## 2021-04-14 PROCEDURE — 99203 OFFICE O/P NEW LOW 30 MIN: CPT | Performed by: ORTHOPAEDIC SURGERY

## 2021-04-14 PROCEDURE — 20611 DRAIN/INJ JOINT/BURSA W/US: CPT | Performed by: ORTHOPAEDIC SURGERY

## 2021-04-14 PROCEDURE — 73560 X-RAY EXAM OF KNEE 1 OR 2: CPT | Performed by: ORTHOPAEDIC SURGERY

## 2021-04-14 RX ORDER — IBUPROFEN 800 MG/1
TABLET ORAL
COMMUNITY

## 2021-04-14 RX ORDER — TRIAMCINOLONE ACETONIDE 40 MG/ML
80 INJECTION, SUSPENSION INTRA-ARTICULAR; INTRAMUSCULAR ONCE
Status: COMPLETED | OUTPATIENT
Start: 2021-04-14 | End: 2021-04-14

## 2021-04-14 RX ORDER — PREDNISONE 10 MG/1
TABLET ORAL AS NEEDED
COMMUNITY

## 2021-04-14 RX ORDER — MELOXICAM 15 MG/1
15 TABLET ORAL
Qty: 30 TAB | Refills: 1 | Status: SHIPPED | OUTPATIENT
Start: 2021-04-14

## 2021-04-14 RX ORDER — ALBUTEROL SULFATE 90 UG/1
AEROSOL, METERED RESPIRATORY (INHALATION) AS NEEDED
COMMUNITY

## 2021-04-14 RX ADMIN — TRIAMCINOLONE ACETONIDE 80 MG: 40 INJECTION, SUSPENSION INTRA-ARTICULAR; INTRAMUSCULAR at 16:35

## 2021-04-14 NOTE — PROGRESS NOTES
Maria M Hurst  1965   Chief Complaint   Patient presents with    Knee Pain     bilateral        HISTORY OF PRESENT ILLNESS  Maria M Hurst is a 60 y.o. female who presents today for evaluation of b/l knee pain. R>L. She rates her pain 7/10 today. Pain has been present for around 1 year. No specific injury. Having difficulty with steps. Notes popping sensation. Feels tight, like a rubber band. Pain has gradually gotten worse. Pt notes swelling, worse with the right knee. Describes aching, throbbing pain while laying down at night. Patient describes the pain as aching and popping and locking that is Intermittent in nature. Symptoms are worse with walking, stairs, standing, straightening, stretching, Activity and is better with  rx pain medication . Associated symptoms include Swelling. Since problem started, it: is unchanged. Pain does not wake patient up at night. Has taken no meds for the problem. Has tried following treatments: Injections:NO; Brace:NO;  Therapy:NO; Cane/Crutch:NO      No Known Allergies     Past Medical History:   Diagnosis Date    Asthma       Social History     Socioeconomic History    Marital status: LEGALLY      Spouse name: Not on file    Number of children: Not on file    Years of education: Not on file    Highest education level: Not on file   Occupational History    Not on file   Social Needs    Financial resource strain: Not on file    Food insecurity     Worry: Not on file     Inability: Not on file    Transportation needs     Medical: Not on file     Non-medical: Not on file   Tobacco Use    Smoking status: Current Every Day Smoker     Packs/day: 0.25    Smokeless tobacco: Never Used   Substance and Sexual Activity    Alcohol use: No    Drug use: Yes     Types: Marijuana     Comment: Every now and then    Sexual activity: Not on file   Lifestyle    Physical activity     Days per week: Not on file     Minutes per session: Not on file    Stress: Not on file   Relationships    Social connections     Talks on phone: Not on file     Gets together: Not on file     Attends Taoist service: Not on file     Active member of club or organization: Not on file     Attends meetings of clubs or organizations: Not on file     Relationship status: Not on file    Intimate partner violence     Fear of current or ex partner: Not on file     Emotionally abused: Not on file     Physically abused: Not on file     Forced sexual activity: Not on file   Other Topics Concern    Not on file   Social History Narrative    Not on file      Past Surgical History:   Procedure Laterality Date    [address]    x2    HX GYN  2020    lump removed from labia    HX OTHER SURGICAL      Gall stones removed      Family History   Problem Relation Age of Onset    Arthritis Mother       Current Outpatient Medications   Medication Sig    albuterol (PROVENTIL HFA, VENTOLIN HFA, PROAIR HFA) 90 mcg/actuation inhaler Take  by inhalation as needed for Wheezing.  predniSONE (DELTASONE) 10 mg tablet Take  by mouth as needed.  NAPROXEN PO Take  by Mouth.  ZOLPIDEM TARTRATE (AMBIEN PO) Take  by mouth.  ibuprofen (MOTRIN) 800 mg tablet Take  by mouth.  tamsulosin (FLOMAX) 0.4 mg capsule Take 1 Cap by mouth daily.  HYDROcodone-acetaminophen (NORCO) 5-325 mg per tablet Take 1 Tab by mouth every six (6) hours as needed for Pain. Max Daily Amount: 4 Tabs.  HYDROcodone-acetaminophen (NORCO) 5-325 mg per tablet Take 1 tablet PO every 6 hours as needed for pain control. If over the counter ibuprofen or acetaminophen was suggested, then only take the vicodin for pain not well controlled with the over the counter medication.  cyclobenzaprine (FLEXERIL) 10 mg tablet Take  by mouth three (3) times daily as needed.  traMADol (ULTRAM) 50 mg tablet Take 50 mg by mouth every six (6) hours as needed. No current facility-administered medications for this visit. REVIEW OF SYSTEM   Patient denies: Weight loss, Fever/Chills, HA, Visual changes, Fatigue, Chest pain, SOB, Abdominal pain, N/V/D/C, Blood in stool or urine, Edema. Pertinent positive as above in HPI. All others were negative    PHYSICAL EXAM:   Visit Vitals  Pulse 79   Temp 97.3 °F (36.3 °C) (Temporal)   Ht 5' 7.5\" (1.715 m)   Wt 275 lb 3.2 oz (124.8 kg)   SpO2 99%   BMI 42.47 kg/m²     The patient is a well-developed, well-nourished female   in no acute distress. The patient is alert and oriented times three. The patient is alert and oriented times three. Mood and affect are normal.  LYMPHATIC: lymph nodes are not enlarged and are within normal limits  SKIN: normal in color and non tender to palpation. There are no bruises or abrasions noted. NEUROLOGICAL: Motor sensory exam is within normal limits. Reflexes are equal bilaterally. There is normal sensation to pinprick and light touch  MUSCULOSKELETAL:  Examination Left knee Right knee   Skin Intact Intact   Range of motion 0-130 0-130   Effusion  +  +   Medial joint line tenderness  +  +   Lateral joint line tenderness  +  +   Tenderness Pes Bursa - -   Tenderness insertion MCL - -   Tenderness insertion LCL - -   Maguis - -   Patella crepitus  +  +   Patella grind  +  +   Lachman - -   Pivot shift - -   Anterior drawer - -   Posterior drawer - -   Varus stress - -   Valgus stress - -   Neurovascular Intact Intact   Calf Swelling and Tenderness to Palpation - -   Reji's Test - -   Hamstring Cord Tightness - -        PROCEDURE:  Bilateral Knee Injection with Ultrasound Guidance    Indication:Bilateral Knee pain/swelling    After sterile prep, 4 cc of Xylocaine and 1 cc of Kenalog were injected into the bilateral  Knee. Intra-articular Ultrasound images captured using [address] [place] [address] Ultrasound machine using a frequency of 10 MHz with a linear transducer and scanned into patient's chart.            [address] [facility]  OFFICE PROCEDURE PROGRESS NOTE        Chart reviewed for the following:  Rajwinder Hoang M.D, have reviewed the History, Physical and updated the Allergic reactions for [address] performed immediately prior to start of procedure:  Rajwinder Hoang M.D, have performed the following reviews on [facility]  prior to the start of the procedure:            * Patient was identified by name and date of birth   * Agreement on procedure being performed was verified  * Risks and Benefits explained to the patient  * Procedure site verified and marked as necessary  * Patient was positioned for comfort  * Needle placement confirmed by ultrasound  * Consent was signed and verified     Time: 3:38 PM     Date of procedure: 4/14/2021    Procedure performed by:  Chetna Solano M.D    Provider assisted by: (see medication administration)    How tolerated by patient: tolerated the procedure well with no complications    Comments: none      IMAGING: XR of left knee with 2 views obtained in the office dated 4/14/2021 was reviewed and read by Dr. Jr Potter: Moderate to severe degenerative changes in the patellofemoral joint, right worse than left        XR of right knee with 2 views obtained in the office dated 4/14/2021 was reviewed and read by Dr. Jr Potter: Marked degenerative changes in the lateral compartment with valgus alignment. Moderate to severe degenerative changes in the patellofemoral joint, right worse than left      IMPRESSION:      ICD-10-CM ICD-9-CM    1. Primary osteoarthritis of right knee  M17.11 715.16 meloxicam (Mobic) 15 mg tablet      triamcinolone acetonide (KENALOG-40) 40 mg/mL injection 80 mg      ARTHROCENTESIS ASPIR&/INJ MAJOR JT/BURSA W/US   2. Chronic pain of both knees  M25.561 719.46 AMB POC XRAY, KNEE; 1/2 VIEWS    M25.562 338.29 AMB POC XRAY, KNEE; 1/2 VIEWS    G89.29     3.  Primary osteoarthritis of left knee  M17.12 715.16 meloxicam (Mobic) 15 mg tablet triamcinolone acetonide (KENALOG-40) 40 mg/mL injection 80 mg      ARTHROCENTESIS ASPIR&/INJ MAJOR JT/BURSA W/US        PLAN:  1. Pt presents today with b/l knee pain due to primary OA and I would like to try injecting both knees with cortisone. Was also given prescription for Mobic today. Risk factors include: BMI>40  2. No ultrasound exam indicated today  3. Yes cortisone injection indicated today B/L KNEES US  4. No Physical/Occupational Therapy indicated today  5. No diagnostic test indicated today:   6. No durable medical equipment indicated today  7. No referral indicated today   8. Yes medications indicated today: MOBIC  9. No Narcotic indicated today       RTC 4 weeks      Scribed by Oliver Arroyo[address]) as dictated by Lizzie Matta MD    I, Dr. Lizzie Matta, confirm that all documentation is accurate.     Lizzie Matta M.D.   [facility]

## 2021-05-18 ENCOUNTER — OFFICE VISIT (OUTPATIENT)
Dept: ORTHOPEDIC SURGERY | Age: 56
End: 2021-05-18
Payer: COMMERCIAL

## 2021-05-18 VITALS
HEART RATE: 78 BPM | OXYGEN SATURATION: 100 % | WEIGHT: 281 LBS | HEIGHT: 68 IN | BODY MASS INDEX: 42.59 KG/M2 | TEMPERATURE: 97.1 F

## 2021-05-18 DIAGNOSIS — M17.12 PRIMARY OSTEOARTHRITIS OF LEFT KNEE: ICD-10-CM

## 2021-05-18 DIAGNOSIS — M17.11 PRIMARY OSTEOARTHRITIS OF RIGHT KNEE: Primary | ICD-10-CM

## 2021-05-18 PROCEDURE — 99213 OFFICE O/P EST LOW 20 MIN: CPT | Performed by: ORTHOPAEDIC SURGERY

## 2021-05-18 NOTE — PROGRESS NOTES
Barry Vergara  1965   Chief Complaint   Patient presents with    Knee Pain     bilateral knees        HISTORY OF PRESENT ILLNESS  Barry Vergara is a 60 y.o. female who presents today for reevaluation of b/l knee. Patient rates pain as 2/10 today. Pain has been present for around 1 year. No specific injury. At last OV on 4/14/2021, patient had a b/l knee cortisone injection which provided some relief. Pain has improved but she is not at 100%. Does note popping sensation in right knee with getting up from a seated position. Has good days and bad days. Patient denies any fever, chills, chest pain, shortness of breath or calf pain. The remainder of the review of systems is negative. There are no new illness or injuries to report since last seen in the office. There are no changes to medications, allergies, family or social history. Pain Assessment  5/18/2021   Location of Pain Knee   Location Modifiers Left;Right   Severity of Pain 2   Quality of Pain Burning; Popping   Quality of Pain Comment burning in right knee   Duration of Pain A few minutes   Frequency of Pain Several times daily   Aggravating Factors Bending;Stretching;Stairs   Limiting Behavior Yes   Relieving Factors Rest;Other (Comment)   Relieving Factors Comment mobic   Result of Injury No     PHYSICAL EXAM:   Visit Vitals  Pulse 78   Temp 97.1 °F (36.2 °C) (Temporal)   Ht 5' 7.5\" (1.715 m)   Wt 281 lb (127.5 kg)   SpO2 100%   BMI 43.36 kg/m²     The patient is a well-developed, well-nourished female   in no acute distress. The patient is alert and oriented times three. The patient is alert and oriented times three. Mood and affect are normal.  LYMPHATIC: lymph nodes are not enlarged and are within normal limits  SKIN: normal in color and non tender to palpation. There are no bruises or abrasions noted. NEUROLOGICAL: Motor sensory exam is within normal limits. Reflexes are equal bilaterally.  There is normal sensation to pinprick and light touch  MUSCULOSKELETAL:  Examination Left knee Right knee   Skin Intact Intact   Range of motion 0-130 0-130   Effusion  +  +   Medial joint line tenderness  +  +   Lateral joint line tenderness  +  +   Tenderness Pes Bursa - -   Tenderness insertion MCL - -   Tenderness insertion LCL - -   Maguis - -   Patella crepitus  +  +   Patella grind  +  +   Lachman - -   Pivot shift - -   Anterior drawer - -   Posterior drawer - -   Varus stress - -   Valgus stress - -   Neurovascular Intact Intact   Calf Swelling and Tenderness to Palpation - -   Reji's Test - -   Hamstring Cord Tightness - -        IMAGING: XR of left knee with 2 views obtained in the office dated 4/14/2021 was reviewed and read by Dr. Sheppard Credit: Moderate to severe degenerative changes in the patellofemoral joint, right worse than left           XR of right knee with 2 views obtained in the office dated 4/14/2021 was reviewed and read by Dr. Sheppard Credit: Marked degenerative changes in the lateral compartment with valgus alignment. Moderate to severe degenerative changes in the patellofemoral joint, right worse than left      IMPRESSION:      ICD-10-CM ICD-9-CM    1. Primary osteoarthritis of right knee  M17.11 715.16 PROCEDURE AUTHORIZATION TO    2. Primary osteoarthritis of left knee  M17.12 715.16 PROCEDURE AUTHORIZATION TO         PLAN:   1. Pt presents today with b/l knee pain due to primary OA and she notes some improvement with the cortisone injections. Will proceed with Euflexxa authorization for both knees as she is still having intermittent pain. Risk factors include: BMI>40  2. No ultrasound exam indicated today  3. No cortisone injection indicated today   4. No Physical/Occupational Therapy indicated today  5. No diagnostic test indicated today:   6. No durable medical equipment indicated today  7. No referral indicated today   8. No medications indicated today:   9.  No Narcotic indicated today      RTC following Euflexxa auth      Scribed by Nadia Hanna (Willian Weir) as dictated by MD TAMIKO Keen, Dr. Brian Das, confirm that all documentation is accurate.     Brian Das M.D.   [facility]

## 2021-06-04 ENCOUNTER — TELEPHONE (OUTPATIENT)
Dept: ORTHOPEDIC SURGERY | Age: 56
End: 2021-06-04

## 2021-06-04 NOTE — TELEPHONE ENCOUNTER
[facility] denied stating pt needs to try PT, weight loss or exercise. Pt is aware and states Cortisone did help quite a bit.

## 2021-12-22 ENCOUNTER — OFFICE VISIT (OUTPATIENT)
Dept: ORTHOPEDIC SURGERY | Age: 56
End: 2021-12-22
Payer: COMMERCIAL

## 2021-12-22 VITALS
BODY MASS INDEX: 42.44 KG/M2 | HEIGHT: 68 IN | WEIGHT: 280 LBS | HEART RATE: 85 BPM | OXYGEN SATURATION: 98 % | TEMPERATURE: 98 F

## 2021-12-22 DIAGNOSIS — M17.11 PRIMARY OSTEOARTHRITIS OF RIGHT KNEE: Primary | ICD-10-CM

## 2021-12-22 PROCEDURE — 99213 OFFICE O/P EST LOW 20 MIN: CPT | Performed by: ORTHOPAEDIC SURGERY

## 2021-12-22 PROCEDURE — 20611 DRAIN/INJ JOINT/BURSA W/US: CPT | Performed by: ORTHOPAEDIC SURGERY

## 2021-12-22 RX ORDER — TRIAMCINOLONE ACETONIDE 40 MG/ML
40 INJECTION, SUSPENSION INTRA-ARTICULAR; INTRAMUSCULAR ONCE
Status: COMPLETED | OUTPATIENT
Start: 2021-12-22 | End: 2021-12-22

## 2021-12-22 RX ADMIN — TRIAMCINOLONE ACETONIDE 40 MG: 40 INJECTION, SUSPENSION INTRA-ARTICULAR; INTRAMUSCULAR at 16:30

## 2021-12-22 NOTE — PROGRESS NOTES
Shonda Valiente  1965   Chief Complaint   Patient presents with    Knee Pain     req inj. right knee        HISTORY OF PRESENT ILLNESS  Shonda Valiente is a 60 y.o. female who presents today for reevaluation of b/l knee. Patient rates pain as 2/10 today. Pain has been present for around 1 year. No specific injury. At last OV on 4/14/2021, patient had a b/l knee cortisone injection which provided some relief. Pain has improved but she is not at 100%. Does note popping sensation in right knee with getting up from a seated position. Has good days and bad days. Patient denies any fever, chills, chest pain, shortness of breath or calf pain. The remainder of the review of systems is negative. There are no new illness or injuries to report since last seen in the office. There are no changes to medications, allergies, family or social history. Pain Assessment  5/18/2021   Location of Pain Knee   Location Modifiers Left;Right   Severity of Pain 2   Quality of Pain Burning; Popping   Quality of Pain Comment burning in right knee   Duration of Pain A few minutes   Frequency of Pain Several times daily   Aggravating Factors Bending;Stretching;Stairs   Limiting Behavior Yes   Relieving Factors Rest;Other (Comment)   Relieving Factors Comment mobic   Result of Injury No     PHYSICAL EXAM:   Visit Vitals  Pulse 85   Temp 98 °F (36.7 °C) (Temporal)   Ht 5' 7.5\" (1.715 m)   Wt 280 lb (127 kg)   SpO2 98%   BMI 43.21 kg/m²     The patient is a well-developed, well-nourished female   in no acute distress. The patient is alert and oriented times three. The patient is alert and oriented times three. Mood and affect are normal.  LYMPHATIC: lymph nodes are not enlarged and are within normal limits  SKIN: normal in color and non tender to palpation. There are no bruises or abrasions noted. NEUROLOGICAL: Motor sensory exam is within normal limits. Reflexes are equal bilaterally.  There is normal sensation to pinprick and light touch  MUSCULOSKELETAL:  Examination Left knee Right knee   Skin Intact Intact   Range of motion 0-130 0-130   Effusion  +  +   Medial joint line tenderness  +  +   Lateral joint line tenderness  +  +   Tenderness Pes Bursa - -   Tenderness insertion MCL - -   Tenderness insertion LCL - -   Maguis - -   Patella crepitus  +  +   Patella grind  +  +   Lachman - -   Pivot shift - -   Anterior drawer - -   Posterior drawer - -   Varus stress - -   Valgus stress - -   Neurovascular Intact Intact   Calf Swelling and Tenderness to Palpation - -   Reji's Test - -   Hamstring Cord Tightness - -        PROCEDURE:   Right knee Injection with Ultrasound Guidance    Indication:Right Knee pain/swelling    After sterile prep, 4 cc of Xylocaine and 1 cc of Kenalog were injected into the right Knee. Intra-articular Ultrasound images captured using [employer] Ultrasound machine using a frequency of 10 MHz with a linear transducer and scanned into patient's chart.            [facility] - [facility]  OFFICE PROCEDURE PROGRESS NOTE        Chart reviewed for the following:  Lindy Jacobson M.D, have reviewed the History, Physical and updated the Allergic reactions for Salina Maldonado performed immediately prior to start of procedure:  Lindy Jacobson M.D, have performed the following reviews on Noelle Smith prior to the start of the procedure:            * Patient was identified by name and date of birth   * Agreement on procedure being performed was verified  * Risks and Benefits explained to the patient  * Procedure site verified and marked as necessary  * Patient was positioned for comfort  * Needle placement confirmed by ultrasound  * Consent was signed and verified     Time: 4:19 PM     Date of procedure: 12/22/2021    Procedure performed by:  Ria Galvan M.D    Provider assisted by: (see medication administration)    How tolerated by patient: tolerated the procedure well with no complications    Comments: none      IMAGING: XR of left knee with 2 views obtained in the office dated 4/14/2021 was reviewed and read by Dr. Jerri Hassan: Moderate to severe degenerative changes in the patellofemoral joint, right worse than left           XR of right knee with 2 views obtained in the office dated 4/14/2021 was reviewed and read by Dr. Jerri Hassan: Marked degenerative changes in the lateral compartment with valgus alignment. Moderate to severe degenerative changes in the patellofemoral joint, right worse than left      IMPRESSION:      ICD-10-CM ICD-9-CM    1. Primary osteoarthritis of right knee  M17.11 715.16         PLAN:   1. Pt presents today with right knee pain due to degenerative arthritis with joint space narrowing and I am hopeful a right knee cortisone injection will provide relief. Return in 3 weeks if pain persists. Risk factors include: BMI>40  2. No ultrasound exam indicated today  3. Yes cortisone injection indicated today R KNEE US   4. No Physical/Occupational Therapy indicated today  5. No diagnostic test indicated today:   6. No durable medical equipment indicated today  7. No referral indicated today   8. No medications indicated today:   9. No Narcotic indicated today      RTC 3 weeks if pain continues       Scribed by Jose Carlos Mckeon [address]) as dictated by Neda Barboza MD    I, Dr. Neda Barboza, confirm that all documentation is accurate.     Neda Barboza M.D.   [facility]

## 2022-06-21 ENCOUNTER — OFFICE VISIT (OUTPATIENT)
Dept: ORTHOPEDIC SURGERY | Age: 57
End: 2022-06-21
Payer: COMMERCIAL

## 2022-06-21 VITALS — HEIGHT: 67 IN | TEMPERATURE: 97.8 F | BODY MASS INDEX: 42.38 KG/M2 | WEIGHT: 270 LBS

## 2022-06-21 DIAGNOSIS — M17.11 PRIMARY OSTEOARTHRITIS OF RIGHT KNEE: Primary | ICD-10-CM

## 2022-06-21 PROCEDURE — 20611 DRAIN/INJ JOINT/BURSA W/US: CPT | Performed by: ORTHOPAEDIC SURGERY

## 2022-06-21 RX ORDER — TRIAMCINOLONE ACETONIDE 40 MG/ML
40 INJECTION, SUSPENSION INTRA-ARTICULAR; INTRAMUSCULAR ONCE
Status: COMPLETED | OUTPATIENT
Start: 2022-06-21 | End: 2022-06-21

## 2022-06-21 RX ADMIN — TRIAMCINOLONE ACETONIDE 40 MG: 40 INJECTION, SUSPENSION INTRA-ARTICULAR; INTRAMUSCULAR at 11:16

## 2022-06-21 NOTE — PROGRESS NOTES
Deirdre Esparza  1965   Chief Complaint   Patient presents with    Knee Pain     libra        HISTORY OF PRESENT ILLNESS  Deirdre Esparza is a 60 y.o. female who presents today for reevaluation of b/l knee. R>L. Patient rates pain as 7/10 today. Pain has been present for awhile, over 1 year. No specific injury. At last OV on 12/22/2021, patient had a right knee cortisone injection. She presents today with recurrent right knee pain and swelling. [facility] on 4/14/2021, patient had a b/l knee cortisone injection which provided some relief. Patient denies any fever, chills, chest pain, shortness of breath or calf pain. The remainder of the review of systems is negative. There are no new illness or injuries to report since last seen in the office. There are no changes to medications, allergies, family or social history. Pain Assessment  6/21/2022   Location of Pain Knee   Location Modifiers Left;Right   Severity of Pain 7   Quality of Pain Dull;Aching   Quality of Pain Comment -   Duration of Pain Persistent   Frequency of Pain Constant   Date Pain First Started (No Data)   Date Pain First Started Comment follow up   Aggravating Factors Standing;Stairs; Bending;Stretching;Walking   Limiting Behavior Yes   Relieving Factors Heat   Relieving Factors Comment -   Result of Injury -     PHYSICAL EXAM:   Visit Vitals  Temp 97.8 °F (36.6 °C) (Temporal)   Ht 5' 7\" (1.702 m)   Wt 270 lb (122.5 kg)   BMI 42.29 kg/m²     The patient is a well-developed, well-nourished female   in no acute distress. The patient is alert and oriented times three. The patient is alert and oriented times three. Mood and affect are normal.  LYMPHATIC: lymph nodes are not enlarged and are within normal limits  SKIN: normal in color and non tender to palpation. There are no bruises or abrasions noted. NEUROLOGICAL: Motor sensory exam is within normal limits. Reflexes are equal bilaterally.  There is normal sensation to pinprick and light touch  MUSCULOSKELETAL:  Examination Left knee Right knee   Skin Intact Intact   Range of motion 0-130 0-130   Effusion  +  +   Medial joint line tenderness  +  +   Lateral joint line tenderness  +  +   Tenderness Pes Bursa - -   Tenderness insertion MCL - -   Tenderness insertion LCL - -   Maguis - -   Patella crepitus  +  +   Patella grind  +  +   Lachman - -   Pivot shift - -   Anterior drawer - -   Posterior drawer - -   Varus stress - -   Valgus stress - -   Neurovascular Intact Intact   Calf Swelling and Tenderness to Palpation - -   Reji's Test - -   Hamstring Cord Tightness - -        PROCEDURE:   Right Knee Injection with Ultrasound Guidance    Indication:Right Knee pain/swelling    After sterile prep, 4 cc of Xylocaine and 1 cc of Kenalog were injected into the right Knee. Intra-articular Ultrasound images captured using [employer] Ultrasound machine using a frequency of 10 MHz with a linear transducer and scanned into patient's chart.            [facility] - [facility]  OFFICE PROCEDURE PROGRESS NOTE        Chart reviewed for the following:  Reyna Richards M.D, have reviewed the History, Physical and updated the Allergic reactions for [address] performed immediately prior to start of procedure:  Reyna Richards M.D, have performed the following reviews on [facility] prior to the start of the procedure:            * Patient was identified by name and date of birth   * Agreement on procedure being performed was verified  * Risks and Benefits explained to the patient  * Procedure site verified and marked as necessary  * Patient was positioned for comfort  * Needle placement confirmed by ultrasound  * Consent was signed and verified     Time: 11:09 AM     Date of procedure: 6/21/2022    Procedure performed by:  Maddy Goins M.D    Provider assisted by: (see medication administration)    How tolerated by patient: tolerated the procedure well with no complications    Comments: none      IMAGING: XR of left knee with 2 views obtained in the office dated 4/14/2021 was reviewed and read by Dr. Mali Cardenas: Moderate to severe degenerative changes in the patellofemoral joint, right worse than left        XR of right knee with 2 views obtained in the office dated 4/14/2021 was reviewed and read by Dr. Mali Cardenas: Marked degenerative changes in the lateral compartment with valgus alignment. Moderate to severe degenerative changes in the patellofemoral joint, right worse than left      IMPRESSION:      ICD-10-CM ICD-9-CM    1. Primary osteoarthritis of right knee  M17.11 715.16 triamcinolone acetonide (KENALOG-40) 40 mg/mL injection 40 mg      ARTHROCENTESIS ASPIR&/INJ MAJOR JT/BURSA W/US        PLAN:   1. Pt presents today with right knee pain due to degenerative arthritis with joint space narrowing and I would like to try a repeat right knee cortisone injection. Risk factors include: BMI>40  2. No ultrasound exam indicated today  3. Yes  cortisone injection indicated today R KNEE US  4. No Physical/Occupational Therapy indicated today  5. No diagnostic test indicated today:   6. No durable medical equipment indicated today  7. No referral indicated today   8. No medications indicated today:   9. No Narcotic indicated today      RTC 3 weeks if pain continues      Scribed by Pierre Beal [address]) as dictated by Lina Lockwood MD    I, Dr. Lina Lockwood, confirm that all documentation is accurate.     Lina Lockwood M.D.   [facility]

## 2022-12-21 ENCOUNTER — OFFICE VISIT (OUTPATIENT)
Dept: ORTHOPEDIC SURGERY | Age: 57
End: 2022-12-21
Payer: COMMERCIAL

## 2022-12-21 VITALS — WEIGHT: 270 LBS | HEIGHT: 67 IN | BODY MASS INDEX: 42.38 KG/M2

## 2022-12-21 DIAGNOSIS — M25.562 CHRONIC PAIN OF LEFT KNEE: ICD-10-CM

## 2022-12-21 DIAGNOSIS — M17.0 PRIMARY OSTEOARTHRITIS OF BOTH KNEES: Primary | ICD-10-CM

## 2022-12-21 DIAGNOSIS — G89.29 CHRONIC PAIN OF RIGHT KNEE: ICD-10-CM

## 2022-12-21 DIAGNOSIS — M25.561 CHRONIC PAIN OF RIGHT KNEE: ICD-10-CM

## 2022-12-21 DIAGNOSIS — G89.29 CHRONIC PAIN OF LEFT KNEE: ICD-10-CM

## 2022-12-21 PROCEDURE — 20611 DRAIN/INJ JOINT/BURSA W/US: CPT | Performed by: ORTHOPAEDIC SURGERY

## 2022-12-21 PROCEDURE — 73560 X-RAY EXAM OF KNEE 1 OR 2: CPT | Performed by: ORTHOPAEDIC SURGERY

## 2022-12-21 PROCEDURE — 99214 OFFICE O/P EST MOD 30 MIN: CPT | Performed by: ORTHOPAEDIC SURGERY

## 2022-12-21 RX ORDER — TRIAMCINOLONE ACETONIDE 40 MG/ML
80 INJECTION, SUSPENSION INTRA-ARTICULAR; INTRAMUSCULAR ONCE
Status: COMPLETED | OUTPATIENT
Start: 2022-12-21 | End: 2022-12-21

## 2022-12-21 RX ADMIN — TRIAMCINOLONE ACETONIDE 80 MG: 40 INJECTION, SUSPENSION INTRA-ARTICULAR; INTRAMUSCULAR at 16:18

## 2022-12-21 NOTE — PROGRESS NOTES
Nancy Guillaume  1965   Chief Complaint   Patient presents with    Knee Pain     bilat          HISTORY OF PRESENT ILLNESS  Nancy Guillaume is a 59 y.o. female who presents today for reevaluation of b/l knee. Patient rates pain as 0/10 today. Pain has been present for awhile. No specific injury. Was last seen in the office on 6/21/2022 at which time she received right knee cortisone injection. She notes b/l knee pain in the office today. At [address] on 12/22/2021, patient had a right knee cortisone injection. The patient also had a b/l knee cortisone injection on 4/14/2021 which provided some relief. Patient denies any fever, chills, chest pain, shortness of breath or calf pain. The remainder of the review of systems is negative. There are no new illness or injuries to report since last seen in the office. There are no changes to medications, allergies, family or social history. PHYSICAL EXAM:   Visit Vitals  Ht 5' 7\" (1.702 m)   Wt 270 lb (122.5 kg)   BMI 42.29 kg/m²       The patient is a well-developed, well-nourished female   in no acute distress. The patient is alert and oriented times three. The patient is alert and oriented times three. Mood and affect are normal.  LYMPHATIC: lymph nodes are not enlarged and are within normal limits  SKIN: normal in color and non tender to palpation. There are no bruises or abrasions noted. NEUROLOGICAL: Motor sensory exam is within normal limits. Reflexes are equal bilaterally.  There is normal sensation to pinprick and light touch  MUSCULOSKELETAL:  Examination Left knee Right knee   Skin Intact Intact   Range of motion 0-130 0-130   Effusion  +  +   Medial joint line tenderness  +  +   Lateral joint line tenderness  +  +   Tenderness Pes Bursa - -   Tenderness insertion MCL - -   Tenderness insertion LCL - -   Maguis - -   Patella crepitus  +  +   Patella grind  +  +   Lachman - -   Pivot shift - -   Anterior drawer - -   Posterior drawer - -   Varus stress - -   Valgus stress - -   Neurovascular Intact Intact   Calf Swelling and Tenderness to Palpation - -   Reji's Test - -   Hamstring Cord Tightness - -        PROCEDURE:   Bilateral Knee Injection with Ultrasound Guidance    Indication:Bilateral Knee pain/swelling    After sterile prep, 4 cc of Xylocaine and 1 cc of Kenalog were injected into the bilateral  Knee. Intra-articular Ultrasound images captured using [facility] Ultrasound machine using a frequency of 10 MHz with a linear transducer and scanned into patient's chart. [facility] - [facility]  OFFICE PROCEDURE PROGRESS NOTE        Chart reviewed for the following:  Dania Pardo M.D, have reviewed the History, Physical and updated the Allergic reactions for [address] performed immediately prior to start of procedure:  Dania Pardo M.D, have performed the following reviews on [facility] prior to the start of the procedure:            * Patient was identified by name and date of birth   * Agreement on procedure being performed was verified  * Risks and Benefits explained to the patient  * Procedure site verified and marked as necessary  * Patient was positioned for comfort  * Needle placement confirmed by ultrasound  * Consent was signed and verified     Time: 3:56 PM     Date of procedure: 12/21/2022    Procedure performed by:  Anastasiia Lockett M.D    Provider assisted by: (see medication administration)    How tolerated by patient: tolerated the procedure well with no complications    Comments: none      IMAGING: XR of the left knee with 2 views obtained in the office dated 12/21/2022 was reviewed and read by Dr. Dayanna Nichole: Marked degenerative changes in the lateral compartment on the right side, less so on the left side.  Moderate degenerative changes in the patellofemoral joint, left worse than right      XR of the right knee with 2 views obtained in the office dated 12/21/2022 was reviewed and read by Dr. Ocampo Delay: Marked degenerative changes in the lateral compartment on the right side, less so on the left side. Moderate degenerative changes in the patellofemoral joint, left worse than right      XR of left knee with 2 views obtained in the office dated 4/14/2021 was reviewed and read by Dr. Ocampo Delay: Moderate to severe degenerative changes in the patellofemoral joint, right worse than left        XR of right knee with 2 views obtained in the office dated 4/14/2021 was reviewed and read by Dr. Ocampo Delay: Marked degenerative changes in the lateral compartment with valgus alignment. Moderate to severe degenerative changes in the patellofemoral joint, right worse than left      IMPRESSION:      ICD-10-CM ICD-9-CM    1. Primary osteoarthritis of both knees  M17.0 715.16       2. Chronic pain of right knee  M25.561 719.46 AMB POC XRAY, KNEE; 1/2 VIEWS    G89.29 338.29       3. Chronic pain of left knee  M25.562 719.46 AMB POC XRAY, KNEE; 1/2 VIEWS    G89.29 338.29              PLAN:   1. Pt presents today with b/l knee pain due to primary OA and I would like to try injecting both knees with cortisone. Risk factors include: BMI>40  2. No ultrasound exam indicated today  3. Yes  cortisone injection indicated today B/L KNEE US   4. No Physical/Occupational Therapy indicated today  5. No diagnostic test indicated today:   6. No durable medical equipment indicated today  7. No referral indicated today   8. No medications indicated today:   9. No Narcotic indicated today      RTC 3 weeks if pain continues       Scribed by Sonny Centeno) as dictated by Emigdio Moss MD    I, Dr. Emigdio Moss, confirm that all documentation is accurate.     Emigdio Moss M.D.   [facility]

## 2023-08-29 ENCOUNTER — OFFICE VISIT (OUTPATIENT)
Age: 58
End: 2023-08-29

## 2023-08-29 VITALS — WEIGHT: 253 LBS | HEIGHT: 67 IN | BODY MASS INDEX: 39.71 KG/M2

## 2023-08-29 DIAGNOSIS — M17.12 PRIMARY OSTEOARTHRITIS OF LEFT KNEE: ICD-10-CM

## 2023-08-29 DIAGNOSIS — M17.11 PRIMARY OSTEOARTHRITIS OF RIGHT KNEE: Primary | ICD-10-CM

## 2023-08-29 RX ORDER — HYDROCODONE BITARTRATE AND ACETAMINOPHEN 5; 325 MG/1; MG/1
1 TABLET ORAL EVERY 6 HOURS PRN
COMMUNITY
Start: 2019-02-06

## 2023-08-29 RX ORDER — TAMSULOSIN HYDROCHLORIDE 0.4 MG/1
0.4 CAPSULE ORAL DAILY
COMMUNITY
Start: 2019-02-21

## 2023-08-29 RX ORDER — IBUPROFEN 800 MG/1
800 TABLET ORAL 3 TIMES DAILY
COMMUNITY
Start: 2023-07-10

## 2023-08-29 RX ORDER — PROGESTERONE 100 MG/1
CAPSULE ORAL
COMMUNITY
Start: 2023-07-23

## 2023-08-29 RX ORDER — TRAMADOL HYDROCHLORIDE 50 MG/1
50 TABLET ORAL EVERY 6 HOURS PRN
COMMUNITY

## 2023-08-29 RX ORDER — PSEUDOEPHEDRINE HCL 30 MG
100 TABLET ORAL 2 TIMES DAILY
COMMUNITY
Start: 2021-06-16

## 2023-08-29 RX ORDER — TRIAMCINOLONE ACETONIDE 40 MG/ML
40 INJECTION, SUSPENSION INTRA-ARTICULAR; INTRAMUSCULAR ONCE
Status: COMPLETED | OUTPATIENT
Start: 2023-08-29 | End: 2023-08-29

## 2023-08-29 RX ORDER — ALBUTEROL SULFATE 90 UG/1
AEROSOL, METERED RESPIRATORY (INHALATION) PRN
COMMUNITY
Start: 2015-08-11

## 2023-08-29 RX ORDER — ZOLPIDEM TARTRATE 5 MG/1
TABLET ORAL
COMMUNITY

## 2023-08-29 RX ORDER — ROSUVASTATIN CALCIUM 5 MG/1
TABLET, COATED ORAL
COMMUNITY
Start: 2023-08-28

## 2023-08-29 RX ORDER — PREDNISONE 10 MG/1
TABLET ORAL
COMMUNITY
Start: 2023-07-31

## 2023-08-29 RX ORDER — PHENOL 1.4 %
2 AEROSOL, SPRAY (ML) MUCOUS MEMBRANE 4 TIMES DAILY
COMMUNITY
Start: 2022-12-19

## 2023-08-29 RX ORDER — FLUTICASONE PROPIONATE 50 MCG
1 SPRAY, SUSPENSION (ML) NASAL DAILY
COMMUNITY
Start: 2022-12-12

## 2023-08-29 RX ORDER — MELOXICAM 15 MG/1
15 TABLET ORAL
COMMUNITY
Start: 2021-04-14

## 2023-08-29 RX ORDER — BENZONATATE 100 MG/1
CAPSULE ORAL
COMMUNITY
Start: 2023-06-04

## 2023-08-29 RX ORDER — CYCLOBENZAPRINE HCL 10 MG
TABLET ORAL 3 TIMES DAILY PRN
COMMUNITY

## 2023-08-29 RX ORDER — AMOXICILLIN AND CLAVULANATE POTASSIUM 875; 125 MG/1; MG/1
1 TABLET, FILM COATED ORAL 2 TIMES DAILY WITH MEALS
COMMUNITY
Start: 2023-08-12

## 2023-08-29 RX ADMIN — TRIAMCINOLONE ACETONIDE 40 MG: 40 INJECTION, SUSPENSION INTRA-ARTICULAR; INTRAMUSCULAR at 13:49

## 2023-08-29 NOTE — PROGRESS NOTES
by Dr. Pippa Pickering: Marked degenerative changes in the lateral compartment on the right side, less so on the  left side. Moderate degenerative changes in the patellofemoral joint, left worse than right         XR of left knee with 2 views obtained in the office dated 4/14/2021 was reviewed and read by Dr. Pippa Pickering: Moderate to severe degenerative changes in the patellofemoral joint, right worse than left           XR of right knee with 2 views obtained in the office dated 4/14/2021 was reviewed and read by Dr. Pippa Pickering: Marked degenerative changes in the lateral compartment with valgus alignment. Moderate to severe degenerative changes in the patellofemoral joint,  right worse than left    IMPRESSION:      ICD-10-CM    1. Primary osteoarthritis of right knee  M17.11 AMB POC US DRAIN/INJECT LARGE JOINT/BURSA     triamcinolone acetonide (KENALOG-40) injection 40 mg      2. Primary osteoarthritis of left knee  M17.12 AMB POC US DRAIN/INJECT LARGE JOINT/BURSA     triamcinolone acetonide (KENALOG-40) injection 40 mg           PLAN:   1. Pt presents today with b/l knee pain due to primary OA and I would like to try injecting both knees with cortisone. Risk factors include: BMI>40  2. Yes cortisone injection indicated today   3. No Physical/Occupational Therapy indicated today  4. No diagnostic test indicated today:   5. No durable medical equipment indicated today  6. No referral indicated today   7. No medications indicated today:   8. No Narcotic indicated today for short term acute pain secondary to knee. Patient given pain medication for short term acute pain relief. Goal is to treat patient according to above plan and to ultimately have patient off all pain medications once appropriate. If chronic pain management is required beyond what is expected for current orthopedic problem, will refer patient to pain management.   was reviewed and will be reviewed with every medication refill request.       RTC as

## 2023-09-11 ENCOUNTER — TELEPHONE (OUTPATIENT)
Age: 58
End: 2023-09-11

## 2023-09-11 NOTE — TELEPHONE ENCOUNTER
Patients PCP called and is requesting the patient last office visit note to be faxed over      Fax  [phone]

## 2025-01-27 NOTE — PROGRESS NOTES
Flaco Parrish  1965   Chief Complaint   Patient presents with    Knee Pain     bilateral        HISTORY OF PRESENT ILLNESS  Flaco Parrish is a 59 y.o. female who presents today for reevaluation of bilateral knee pain. Pain is a 4/10. Pt received a bilateral knee cortisone injection last OV on 8/29/2023 in which her knees responded to for a while. She ambulates with a cane. She is not diabetic.  Having significant difficulty in a day-to-day basis with any walking for any length of time and prolonged standing    Patient denies any fever, chills, chest pain, shortness of breath or calf pain. The remainder of the review of systems is negative. There are no new illness or injuries other than that mentioned above to report since last seen in the office. No changes in medications, allergies, social or family history.      PHYSICAL EXAM:   Ht 1.702 m (5' 7\")   Wt 114.8 kg (253 lb)   BMI 39.63 kg/m²   The patient is a well-developed, well-nourished female   in no acute distress.  The patient is alert and oriented times three.  The patient is alert and oriented times three. Mood and affect are normal.  LYMPHATIC: lymph nodes are not enlarged and are within normal limits  SKIN: normal in color and non tender to palpation. There are no bruises or abrasions noted.   NEUROLOGICAL: Motor sensory exam is within normal limits. Reflexes are equal bilaterally. There is normal sensation to pinprick and light touch  MUSCULOSKELETAL:  Examination Left knee Right knee   Skin Intact Intact   Range of motion     Effusion + +   Medial joint line tenderness + +   Lateral joint line tenderness + +   Tenderness Pes Bursa - -   Tenderness insertion MCL - -   Tenderness insertion LCL - -   Ashwin’s - -   Patella crepitus + +    - -   Lachman - -   Pivot shift - -   Anterior drawer - -   Posterior drawer - -   Varus stress - -   Valgus stress - -   Neurovascular Intact Intact   Calf Swelling and Tenderness to Palpation -

## 2025-01-28 ENCOUNTER — OFFICE VISIT (OUTPATIENT)
Age: 60
End: 2025-01-28
Payer: COMMERCIAL

## 2025-01-28 VITALS — HEIGHT: 67 IN | WEIGHT: 253 LBS | BODY MASS INDEX: 39.71 KG/M2

## 2025-01-28 DIAGNOSIS — M17.11 PRIMARY OSTEOARTHRITIS OF RIGHT KNEE: Primary | ICD-10-CM

## 2025-01-28 DIAGNOSIS — M17.12 PRIMARY OSTEOARTHRITIS OF LEFT KNEE: ICD-10-CM

## 2025-01-28 PROCEDURE — 99214 OFFICE O/P EST MOD 30 MIN: CPT | Performed by: ORTHOPAEDIC SURGERY

## 2025-01-28 PROCEDURE — 20611 DRAIN/INJ JOINT/BURSA W/US: CPT | Performed by: ORTHOPAEDIC SURGERY

## 2025-01-28 PROCEDURE — 73560 X-RAY EXAM OF KNEE 1 OR 2: CPT | Performed by: ORTHOPAEDIC SURGERY

## 2025-01-28 RX ORDER — TRIAMCINOLONE ACETONIDE 40 MG/ML
80 INJECTION, SUSPENSION INTRA-ARTICULAR; INTRAMUSCULAR ONCE
Status: COMPLETED | OUTPATIENT
Start: 2025-01-28 | End: 2025-01-28

## 2025-01-28 RX ADMIN — TRIAMCINOLONE ACETONIDE 80 MG: 40 INJECTION, SUSPENSION INTRA-ARTICULAR; INTRAMUSCULAR at 08:39
